# Patient Record
Sex: MALE | Race: BLACK OR AFRICAN AMERICAN | NOT HISPANIC OR LATINO | Employment: UNEMPLOYED | URBAN - METROPOLITAN AREA
[De-identification: names, ages, dates, MRNs, and addresses within clinical notes are randomized per-mention and may not be internally consistent; named-entity substitution may affect disease eponyms.]

---

## 2018-11-08 ENCOUNTER — DOCUMENTATION ONLY (OUTPATIENT)
Dept: SURGERY | Facility: CLINIC | Age: 68
End: 2018-11-08

## 2018-11-08 ENCOUNTER — OFFICE VISIT (OUTPATIENT)
Dept: SURGERY | Facility: CLINIC | Age: 68
End: 2018-11-08
Payer: COMMERCIAL

## 2018-11-08 VITALS
HEIGHT: 70 IN | SYSTOLIC BLOOD PRESSURE: 147 MMHG | OXYGEN SATURATION: 96 % | WEIGHT: 210.6 LBS | DIASTOLIC BLOOD PRESSURE: 82 MMHG | HEART RATE: 82 BPM | TEMPERATURE: 98.3 F | BODY MASS INDEX: 30.15 KG/M2

## 2018-11-08 DIAGNOSIS — K40.90 LEFT INGUINAL HERNIA: Primary | ICD-10-CM

## 2018-11-08 RX ORDER — CEFAZOLIN SODIUM 2 G/50ML
2 SOLUTION INTRAVENOUS
Status: CANCELLED | OUTPATIENT
Start: 2018-11-08

## 2018-11-08 RX ORDER — BIOTIN 10 MG
TABLET ORAL
COMMUNITY

## 2018-11-08 RX ORDER — CEFAZOLIN SODIUM 1 G/50ML
1 INJECTION, SOLUTION INTRAVENOUS SEE ADMIN INSTRUCTIONS
Status: CANCELLED | OUTPATIENT
Start: 2018-11-08

## 2018-11-08 ASSESSMENT — PAIN SCALES - GENERAL: PAINLEVEL: NO PAIN (0)

## 2018-11-08 NOTE — MR AVS SNAPSHOT
After Visit Summary   2018    William Kapoor    MRN: [id number]           Patient Information     Date Of Birth          1950        Visit Information        Provider Department      2018 9:45 AM Hui Heaton Jorge Alfredo, MD [facility] General Surgery        Care Instructions    You met with Dr. Jose Chahal.      Today's visit instructions:    You will need to see your PCP for a pre op history and physical prior to surgery. This needs to be done within 30 days of your surgery date.        If you have questions please contact Rolo RN or Nathaly RN during regular clinic hours, Monday through Friday 7:30 AM - 4:00 PM, or you can contact us via [employer] at anytime.       If you have urgent needs after-hours, weekends, or holidays please call the hospital at [phone] and ask to speak with our on-call General Surgery Team.    Appointment [phone], option #1   Nurse Advice (Rolo or Nathaly): [phone]   Surgery Scheduler ([place]): [phone]  Fax: [phone]        After Your Laparoscopic Left Robotic Inguinal Hernia Repair         Incision care     Remove the bandage the day after surgery, but leave the medical tape (Steri-Strips) or glue in place. These will loosen and fall off on their own 5 to 7 days after surgery.     You may take a shower the day after surgery. Carefully wash your incision with soap and water. Do not submerge yourself in water (bath, whirlpool, hot tub, pool, lake) for 14 days after surgery.       Always wash your hands before touching your incisions or removing bandages.      Other medicines     Wait to start aspirin or blood thinners until the day after surgery. You can continue your regular medicines at your normal time the day after surgery.     Your pain medicine may cause constipation (hard, dry stools). To help with this, take the stool softener your doctor gave you or an over-the-counter stool softener or laxative. You can stop  taking this when you are no longer taking pain medicine and your bowel movements are back to normal.      For pain or discomfort     Take the narcotic pain medicine your doctor gave you as needed and as instructed on the bottle. If you prefer to use over-the-counter medication, use acetaminophen (Tylenol) or ibuprofen (Advil, Motrin) as instructed on the box. Do not take Tylenol if it is in your narcotic pain medication.     Use an ice pack on your groin for 20 minutes at a time as needed for the first 24 hours. Be sure to protect your skin by putting a cloth between the ice pack and your skin.     After 24 hours you can switch to heat for 20 minutes as needed. Be sure to protect your skin by putting a cloth between the heat pack and your skin.     It is normal to feel a lump in your groin after surgery. This lump may take up to 8 weeks to go away.      Activities     No driving until you feel it s safe to do so. Don t drive while taking narcotic pain medicine.     You can return to your other activities as normal, no restrictions.      Special equipment     If we gave you an athletic supporter, wear this for the first 3 days after surgery. You can wear it longer than this if you wish.      Diet     You can eat your regular meals after surgery.      When to call the doctor   Call your doctor if you have:     A fever above 101 F (38.3 C) (taken under the tongue), or a fever or chills lasting more than a day.     Redness at the incision site.     Any fluid or blood draining from the incision, especially if it smells bad.      Severe pain that doesn t improve with pain medicine.      Go to the emergency room if:   You can t urinate (pee) or feel pressure when you urinate. We will place a small, thin tube (catheter) to empty your bladder. This needs to stay in place for at least 2 days.      We will call you 2 to 4 days after surgery to review this handout, answer questions and help arrange after-surgery care. If you  "have questions or concerns, please call [phone] during regular office hours. If you need to call after business hours, call [phone] and ask to page the surgeon on-call.                                     Follow-ups after your visit        Who to contact     Please call your clinic at [phone] to:    Ask questions about your health    Make or cancel appointments    Discuss your medicines    Learn about your test results    Speak to your doctor            Additional Information About Your Visit        [employer] Information     [employer] is an electronic gateway that provides easy, online access to your medical records. With [employer], you can request a clinic appointment, read your test results, renew a prescription or communicate with your care team.     To sign up for [employer] visit the website at [email or web address]   You will be asked to enter the access code listed below, as well as some personal information. Please follow the directions to create your username and password.     Your access code is: [email or web address]  Expires: 2019  5:30 AM     Your access code will  in 90 days. If you need help or a new code, please contact your [facility] or call [phone] for assistance.        Care EveryWhere ID     This is your Care EveryWhere ID. This could be used by other organizations to access your [facility] medical records  [id number]        Your Vitals Were     Pulse Temperature Height Pulse Oximetry BMI (Body Mass Index)       82 98.3  F (36.8  C) (Oral) 1.778 m (5' 10\") 96% 30.22 kg/m2        Blood Pressure from Last 3 Encounters:   18 147/82   07/09/15 142/84   06/09/15 (!) 157/94    Weight from Last 3 Encounters:   18 95.5 kg (210 lb 9.6 oz)   07/09/15 93.4 kg (206 lb)   06/09/15 94.6 kg (208 lb 9.6 oz)              Today, you had the following     No orders found for display       Primary Care Provider Office Phone # Fax #    Eric Lott " [phone]       [facility] [address]    [facility] [address]        Equal Access to Services     ROBERT WRIGHT : Hadii aad ku hadmargaritobirdie Bridgettali, ioanastiven manjarreztysonha, viviennechase okeefemaria l glenjosesito, jenny mercedesin hayaan glenjose villarreal lanirajkatherine ewa So [facility] [phone].    ATENCIÓN: Si habla español, tiene a molina disposición servicios gratuitos de asistencia lingüística. Mamie al [phone].    We comply with applicable federal civil rights laws and [place] laws. We do not discriminate on the basis of race, color, national origin, age, disability, sex, sexual orientation, or gender identity.            Thank you!     Thank you for choosing [facility] SURGERY  for your care. Our goal is always to provide you with excellent care. Hearing back from our patients is one way we can continue to improve our services. Please take a few minutes to complete the written survey that you may receive in the mail after your visit with us. Thank you!             Your Updated Medication List - Protect others around you: Learn how to safely use, store and throw away your medicines at www.disposemymeds.org.          This list is accurate as of 11/8/18 10:33 AM.  Always use your most recent med list.                   Brand Name Dispense Instructions for use Diagnosis    ASPIRIN PO           cloNIDine 0.1 MG tablet    CATAPRES     Take 0.1 mg by mouth 2 times daily        metoprolol succinate 25 MG 24 hr tablet    TOPROL-XL    45 tablet    Take 0.5 tablets (12.5 mg) by mouth daily    Hypertension goal BP (blood pressure) < 140/90       MULTIVITAMIN ADULT Chew           omeprazole 40 MG capsule    priLOSEC    30 capsule    Take 1 capsule (40 mg) by mouth daily Take 30-60 minutes before a meal.    Gastritis       order for DME     1 each    BP cuff, brand as covered by insurance.  Dx: HTN    Hypertension goal BP (blood pressure) < 140/90       order for DME     1 Device    Equipment being ordered: BP monitoring equipment for  daily use.    Hypertension

## 2018-11-08 NOTE — NURSING NOTE
Pre and Post op Patient Education/Teaching Flowsheet  Relevant Diagnosis:  Inguinal hernia  Teaching Topic:  Pre and post op teaching  Person(s) Involved in teaching:  Patient with the use of  services, Caridad    Motivation Level:  Asks Questions:  Yes  Eager to Learn:  Yes  Cooperative:  Yes  Receptive (willing/able to accept information):  Yes  Any cultural factors/Roman Catholic beliefs that may influence understanding or compliance?  No    Patient/caregiver/family demonstrates understanding of the following:  Reason for the appointment, diagnosis, and treatment plan:  Yes  Patient demonstrates understanding of the following:  Pre-op bowel prep:  No  Post-op pain management recommendations (medications, ice compress, binder/athletic supporter (if applicable), etc.:  Yes  Inguinal hernia patients:  Post-op urinary retention- discussed signs/symptoms and visit to ER for Hong catheter placement and to stay in place for at least 48 hours:  Yes  Restrictions:  Yes  Medications to take the day of surgery:  Per PCP  Blood thinner medications discussed and when to stop (if applicable):  Yes  Wound care:  Yes  Diabetes medication management (if applicable):  Per PCP  Which situations necessitate calling provider and whom to contact:  Discussed how to contact the hospital, nurse, and clinic scheduling staff if necessary      Date and time of surgery:  Yes  Location of surgery: [address] [place] [address]  History and Physical and any other testing necessary prior to surgery:  Yes  Required time line for completion of History and Physical and any pre-op testing:  Yes  Discuss need for someone to drive patient home and stay with them for 24 hours:  Yes  Pre-op showering/scrub information with Surgical Scrub:  Yes  NPO Guidelines:  NPO per Anesthesia Guidelines    Infection Prevention: Patient demonstrates understanding of the following:  Patient instructed on hand hygiene:  Yes  Surgical procedure site care will be taught  and will be reviewed at the time of discharge  Signs and symptoms of infection taught:  Yes  Wound care reviewed and will be taught at the time of discharge  Central venous catheter care will be taught at the time of discharge (if applicable)    Post-op follow-up:  Instructional materials used/given/mailed:  [facility] Surgery Booklet, post op teaching sheet, Map, Soap, and arrival/location information    Surgical instructions mailed to patient

## 2018-11-08 NOTE — PATIENT INSTRUCTIONS
You met with Dr. Jsoe Chahal.      Today's visit instructions:    You will need to see your PCP for a pre op history and physical prior to surgery. This needs to be done within 30 days of your surgery date.        If you have questions please contact Rolo RN or Nathaly RN during regular clinic hours, Monday through Friday 7:30 AM - 4:00 PM, or you can contact us via [employer] My Mobile at anytime.       If you have urgent needs after-hours, weekends, or holidays please call the hospital at [phone] and ask to speak with our on-call General Surgery Team.    Appointment [phone], option #1   Nurse Advice (Rolo or Nathaly): [phone]   Surgery Scheduler (Jaye): [phone]  Fax: [phone]        After Your Laparoscopic Left Robotic Inguinal Hernia Repair         Incision care     Remove the bandage the day after surgery, but leave the medical tape (Steri-Strips) or glue in place. These will loosen and fall off on their own 5 to 7 days after surgery.     You may take a shower the day after surgery. Carefully wash your incision with soap and water. Do not submerge yourself in water (bath, whirlpool, hot tub, pool, lake) for 14 days after surgery.       Always wash your hands before touching your incisions or removing bandages.      Other medicines     Wait to start aspirin or blood thinners until the day after surgery. You can continue your regular medicines at your normal time the day after surgery.     Your pain medicine may cause constipation (hard, dry stools). To help with this, take the stool softener your doctor gave you or an over-the-counter stool softener or laxative. You can stop taking this when you are no longer taking pain medicine and your bowel movements are back to normal.      For pain or discomfort     Take the narcotic pain medicine your doctor gave you as needed and as instructed on the bottle. If you prefer to use over-the-counter medication, use acetaminophen (Tylenol) or ibuprofen  (Advil, Motrin) as instructed on the box. Do not take Tylenol if it is in your narcotic pain medication.     Use an ice pack on your groin for 20 minutes at a time as needed for the first 24 hours. Be sure to protect your skin by putting a cloth between the ice pack and your skin.     After 24 hours you can switch to heat for 20 minutes as needed. Be sure to protect your skin by putting a cloth between the heat pack and your skin.     It is normal to feel a lump in your groin after surgery. This lump may take up to 8 weeks to go away.      Activities     No driving until you feel it s safe to do so. Don t drive while taking narcotic pain medicine.     You can return to your other activities as normal, no restrictions.      Special equipment     If we gave you an athletic supporter, wear this for the first 3 days after surgery. You can wear it longer than this if you wish.      Diet     You can eat your regular meals after surgery.      When to call the doctor   Call your doctor if you have:     A fever above 101 F (38.3 C) (taken under the tongue), or a fever or chills lasting more than a day.     Redness at the incision site.     Any fluid or blood draining from the incision, especially if it smells bad.      Severe pain that doesn t improve with pain medicine.      Go to the emergency room if:   You can t urinate (pee) or feel pressure when you urinate. We will place a small, thin tube (catheter) to empty your bladder. This needs to stay in place for at least 2 days.      We will call you 2 to 4 days after surgery to review this handout, answer questions and help arrange after-surgery care. If you have questions or concerns, please call [phone] during regular office hours. If you need to call after business hours, call [phone] and ask to page the surgeon on-call.

## 2018-11-08 NOTE — NURSING NOTE
"Chief Complaint   Patient presents with     Consult     NEW       Vitals:    11/08/18 1004   BP: 147/82   Pulse: 82   Temp: 98.3  F (36.8  C)   TempSrc: Oral   SpO2: 96%   Weight: 210 lb 9.6 oz   Height: 5' 10\"       Body mass index is 30.22 kg/(m^2).      Bob Thayer on 11/8/2018 at 10:10 AM                          "

## 2018-11-08 NOTE — PROGRESS NOTES
Patient is scheduled for surgery with Dr. Jose Chahal      Spoke with(through an ): William Kapoor in clinic about surgery dates    Date of Surgery: 11/21/2018 at 10:15 AM with Dr. Jose Chahal    H&P: To be scheduled with Eric Lott or a partner. Patient is aware that he can call to schedule a pre-op with the [facility] ([facility]) if he is unable to schedule with his primary doctor.      Informed patient they will need an adult : YES    Special Equipment: [employer] robot    Surgery packet sent: Given to patient in clinic on 11/08/2018    Additional comments: He also knows to call general surgery if he experiences any cold or flu symptoms. Surgery teaching and soap were given to in clinic on 11/08/2018. He was asked to call [phone] if he needs to reschedule and [phone] if he  experiences any symptoms.

## 2018-11-08 NOTE — LETTER
11/8/2018       RE: William Kapoor  [address]  [address]  [facility] [address]     Dear Colleague,    Thank you for referring your patient, William Kapoor, to the [facility] GENERAL SURGERY at [facility]. Please see a copy of my visit note below.    William Kapoor is a 68 year old male with a 1 month history of a left inguinal mass with the following symptoms of lump and pain.    Finding was not work related.  Onset did not occur with lifting.  Obstructive symptoms:  no  Urinary difficulties:  no  Chronic cough: no  Constipation:  no  Current level of activity:  Low, does not work, lives alone but family in area.    Past medical and surgical history, medications, allergies, family history, and social history were reviewed with the patient.    ROS: 10 point review of systems negative except noted in HPI  PHYSICAL EXAM  General appearance- healthy, alert, and in no distress.  Skin- Skin color and turgor normal.  No obvious rashes.  Neck- Neck is supple without obvious adenopathy.  Lungs- Respiratory effort unlabored.  Gait- Normal.  Abdomen - soft non distended, non tender without obvious masses.  Left inguinal hernia, right without defect.  Impression:  Inguinal hernia, left  Recommendation:  Laparoscopic left Inguinal Hernioplasty robot.    A full discussion regarding the alternatives, risks, goals, and potential complications for this surgery was completed today.  The patient understood I would use non recalled mesh and  that the potential problems included but are not limited to:  Infection, bleeding, hematoma, seroma, recurrence, injury to nerve/muscle or involved testicle, ischemic orchitis, and chronic pain.    The patient verbally expressed understanding, was given the opportunity for questions, and gives full informed consent for the procedure.      Today the patient was instructed on the need for a preoperative H&P, NPO status prior to surgery, and the need to stop  anticoagulants prior to surgery.  Additional educational material, soap, and instructions will be mailed out to the patients home.    The total time spent with this patient was 30 minutes.  Of this time, greater than 50% was spent counseling and coordinating care.      Lovenox:  No    Again, thank you for allowing me to participate in the care of your patient.      Sincerely,    Jose Chahal MD

## 2018-11-08 NOTE — PROGRESS NOTES
William Kapoor is a 68 year old male with a 1 month history of a left inguinal mass with the following symptoms of lump and pain.    Finding was not work related.  Onset did not occur with lifting.  Obstructive symptoms:  no  Urinary difficulties:  no  Chronic cough: no  Constipation:  no  Current level of activity:  Low, does not work, lives alone but family in area.    Past medical and surgical history, medications, allergies, family history, and social history were reviewed with the patient.    ROS: 10 point review of systems negative except noted in HPI  PHYSICAL EXAM  General appearance- healthy, alert, and in no distress.  Skin- Skin color and turgor normal.  No obvious rashes.  Neck- Neck is supple without obvious adenopathy.  Lungs- Respiratory effort unlabored.  Gait- Normal.  Abdomen - soft non distended, non tender without obvious masses.  Left inguinal hernia, right without defect.  Impression:  Inguinal hernia, left  Recommendation:  Laparoscopic left Inguinal Hernioplasty robot.    A full discussion regarding the alternatives, risks, goals, and potential complications for this surgery was completed today.  The patient understood I would use non recalled mesh and  that the potential problems included but are not limited to:  Infection, bleeding, hematoma, seroma, recurrence, injury to nerve/muscle or involved testicle, ischemic orchitis, and chronic pain.    The patient verbally expressed understanding, was given the opportunity for questions, and gives full informed consent for the procedure.      Today the patient was instructed on the need for a preoperative H&P, NPO status prior to surgery, and the need to stop anticoagulants prior to surgery.  Additional educational material, soap, and instructions will be mailed out to the patients home.    The total time spent with this patient was 30 minutes.  Of this time, greater than 50% was spent counseling and coordinating care.      Lovenox:  No

## 2018-11-19 ENCOUNTER — TELEPHONE (OUTPATIENT)
Dept: SURGERY | Facility: CLINIC | Age: 68
End: 2018-11-19

## 2018-11-19 NOTE — TELEPHONE ENCOUNTER
[facility] Call Center    Phone Message    May a detailed message be left on voicemail: yes    Reason for Call: Other: Roro calling from [employer], needs information for pt's pre-op physical, pt did not know location or number of where he had it done but he claims he did send everything to his doctor, please call Roro to make sure everything is there     Action Taken: Message routed to:  [facility] ([facility] General Surgery

## 2018-11-19 NOTE — TELEPHONE ENCOUNTER
This writer called the patient and left a message letting him know that his surgery will be cancelled tomorrow if he does not have a pre-op physical. Patient was asked to call [phone] to discuss.

## 2018-11-20 ENCOUNTER — ANESTHESIA EVENT (OUTPATIENT)
Dept: SURGERY | Facility: CLINIC | Age: 68
End: 2018-11-20
Payer: COMMERCIAL

## 2018-11-20 ENCOUNTER — TELEPHONE (OUTPATIENT)
Dept: SURGERY | Facility: CLINIC | Age: 68
End: 2018-11-20

## 2018-11-20 NOTE — TELEPHONE ENCOUNTER
This writer received a call from pre-admission nursing about this patient. The patient had not submitted a pre-op physcial. This writer called the patient and was given [phone] as a contact number for his doctor. This writer provided this number to the pre-admission nursing. They reached out to the clinic and asked them to fax a pre-op physical.    The patient said he would go to the clinic and ask them to send the notes. This writer received a call from Dr. Cruz asking if he could speak to the pre-admission nursing. This writer offered to transfer him and he opted not to be transferred. This writer confirmed fax number with the doctor.     This writer received a confirmation call from pre-admission nursing that the pre-op physical had been received and was okay to use for clearance.    Patient's son called and left a message asking for clarification about his father's surgery. This writer attempted to reach him and left a voicemail.

## 2018-11-20 NOTE — TELEPHONE ENCOUNTER
This writer called the patient using  services. This writer confirmed that the patient could have surgery tomorrow. The patient confirmed arrival time and location with this writer. He wanted to have the address sent to his phone and this writer was unable to do this for the patient. The patient asked his daughter-in-law to provide an email. This writer will send an email with the surgery information to her at [email or web address].

## 2018-11-20 NOTE — OR NURSING
Called around to numerous clinics looking for an H&P for patient. The most recent one I found was from [facility] with Dr. Pendleton in July of 2018.  Spoke with Jaye and she said the Dr. Chahal would like to cancel patient and reschedule when he gets his H&P completed.  Jaye will call patient with an  and explain this to him.

## 2018-11-21 ENCOUNTER — OFFICE VISIT (OUTPATIENT)
Dept: INTERPRETER SERVICES | Facility: CLINIC | Age: 68
End: 2018-11-21
Payer: COMMERCIAL

## 2018-11-21 ENCOUNTER — HOSPITAL ENCOUNTER (OUTPATIENT)
Facility: CLINIC | Age: 68
Discharge: HOME | End: 2018-11-21
Attending: SURGERY | Admitting: SURGERY
Payer: COMMERCIAL

## 2018-11-21 ENCOUNTER — SURGERY (OUTPATIENT)
Age: 68
End: 2018-11-21

## 2018-11-21 ENCOUNTER — ANESTHESIA (OUTPATIENT)
Dept: SURGERY | Facility: CLINIC | Age: 68
End: 2018-11-21
Payer: COMMERCIAL

## 2018-11-21 VITALS
TEMPERATURE: 97.9 F | DIASTOLIC BLOOD PRESSURE: 67 MMHG | HEIGHT: 70 IN | OXYGEN SATURATION: 95 % | BODY MASS INDEX: 29.76 KG/M2 | WEIGHT: 207.89 LBS | RESPIRATION RATE: 18 BRPM | SYSTOLIC BLOOD PRESSURE: 120 MMHG

## 2018-11-21 DIAGNOSIS — K40.90 LEFT INGUINAL HERNIA: Primary | ICD-10-CM

## 2018-11-21 LAB — GLUCOSE BLDC GLUCOMTR-MCNC: 111 MG/DL (ref 70–99)

## 2018-11-21 PROCEDURE — 37000008 ZZH ANESTHESIA TECHNICAL FEE, 1ST 30 MIN: Performed by: SURGERY

## 2018-11-21 PROCEDURE — 25000128 H RX IP 250 OP 636: Performed by: SURGERY

## 2018-11-21 PROCEDURE — C1781 MESH (IMPLANTABLE): HCPCS | Performed by: SURGERY

## 2018-11-21 PROCEDURE — 25000125 ZZHC RX 250: Performed by: NURSE ANESTHETIST, CERTIFIED REGISTERED

## 2018-11-21 PROCEDURE — 36000086 ZZH SURGERY LEVEL 8 1ST 30 MIN UMMC: Performed by: SURGERY

## 2018-11-21 PROCEDURE — 25000128 H RX IP 250 OP 636: Performed by: NURSE ANESTHETIST, CERTIFIED REGISTERED

## 2018-11-21 PROCEDURE — 71000027 ZZH RECOVERY PHASE 2 EACH 15 MINS: Performed by: SURGERY

## 2018-11-21 PROCEDURE — 25000128 H RX IP 250 OP 636: Performed by: ANESTHESIOLOGY

## 2018-11-21 PROCEDURE — 25000566 ZZH SEVOFLURANE, EA 15 MIN: Performed by: SURGERY

## 2018-11-21 PROCEDURE — 25000125 ZZHC RX 250: Performed by: ANESTHESIOLOGY

## 2018-11-21 PROCEDURE — 25000132 ZZH RX MED GY IP 250 OP 250 PS 637: Performed by: ANESTHESIOLOGY

## 2018-11-21 PROCEDURE — 27210794 ZZH OR GENERAL SUPPLY STERILE: Performed by: SURGERY

## 2018-11-21 PROCEDURE — C9290 INJ, BUPIVACAINE LIPOSOME: HCPCS | Performed by: ANESTHESIOLOGY

## 2018-11-21 PROCEDURE — T1013 SIGN LANG/ORAL INTERPRETER: HCPCS | Mod: U3

## 2018-11-21 PROCEDURE — 40000171 ZZH STATISTIC PRE-PROCEDURE ASSESSMENT III: Performed by: SURGERY

## 2018-11-21 PROCEDURE — 71000015 ZZH RECOVERY PHASE 1 LEVEL 2 EA ADDTL HR: Performed by: SURGERY

## 2018-11-21 PROCEDURE — 71000014 ZZH RECOVERY PHASE 1 LEVEL 2 FIRST HR: Performed by: SURGERY

## 2018-11-21 PROCEDURE — 82962 GLUCOSE BLOOD TEST: CPT

## 2018-11-21 PROCEDURE — 36000088 ZZH SURGERY LEVEL 8 EA 15 ADDTL MIN - UMMC: Performed by: SURGERY

## 2018-11-21 PROCEDURE — 25000132 ZZH RX MED GY IP 250 OP 250 PS 637: Performed by: SURGERY

## 2018-11-21 PROCEDURE — 37000009 ZZH ANESTHESIA TECHNICAL FEE, EACH ADDTL 15 MIN: Performed by: SURGERY

## 2018-11-21 DEVICE — MESH PROGRIP LAPAROSCOPIC 5.9X3.9" PARIETEX SELF-FIX LPG1510: Type: IMPLANTABLE DEVICE | Site: GROIN | Status: FUNCTIONAL

## 2018-11-21 RX ORDER — HYDROCODONE BITARTRATE AND ACETAMINOPHEN 5; 325 MG/1; MG/1
1-2 TABLET ORAL EVERY 4 HOURS PRN
Qty: 15 TABLET | Refills: 0 | Status: SHIPPED | OUTPATIENT
Start: 2018-11-21

## 2018-11-21 RX ORDER — ONDANSETRON 2 MG/ML
INJECTION INTRAMUSCULAR; INTRAVENOUS PRN
Status: DISCONTINUED | OUTPATIENT
Start: 2018-11-21 | End: 2018-11-21

## 2018-11-21 RX ORDER — LIDOCAINE HYDROCHLORIDE 20 MG/ML
INJECTION, SOLUTION INFILTRATION; PERINEURAL PRN
Status: DISCONTINUED | OUTPATIENT
Start: 2018-11-21 | End: 2018-11-21

## 2018-11-21 RX ORDER — BUPIVACAINE HYDROCHLORIDE AND EPINEPHRINE 2.5; 5 MG/ML; UG/ML
INJECTION, SOLUTION INFILTRATION; PERINEURAL PRN
Status: DISCONTINUED | OUTPATIENT
Start: 2018-11-21 | End: 2018-11-21

## 2018-11-21 RX ORDER — AMOXICILLIN 250 MG
1-2 CAPSULE ORAL 2 TIMES DAILY
Qty: 30 TABLET | Refills: 0 | Status: SHIPPED | OUTPATIENT
Start: 2018-11-21

## 2018-11-21 RX ORDER — HYDROCODONE BITARTRATE AND ACETAMINOPHEN 5; 325 MG/1; MG/1
1 TABLET ORAL
Status: COMPLETED | OUTPATIENT
Start: 2018-11-21 | End: 2018-11-21

## 2018-11-21 RX ORDER — PROPOFOL 10 MG/ML
INJECTION, EMULSION INTRAVENOUS PRN
Status: DISCONTINUED | OUTPATIENT
Start: 2018-11-21 | End: 2018-11-21

## 2018-11-21 RX ORDER — CEFAZOLIN SODIUM 2 G/100ML
2 INJECTION, SOLUTION INTRAVENOUS
Status: COMPLETED | OUTPATIENT
Start: 2018-11-21 | End: 2018-11-21

## 2018-11-21 RX ORDER — NALOXONE HYDROCHLORIDE 0.4 MG/ML
.1-.4 INJECTION, SOLUTION INTRAMUSCULAR; INTRAVENOUS; SUBCUTANEOUS
Status: DISCONTINUED | OUTPATIENT
Start: 2018-11-21 | End: 2018-11-21 | Stop reason: HOSPADM

## 2018-11-21 RX ORDER — CEFAZOLIN SODIUM 1 G/3ML
1 INJECTION, POWDER, FOR SOLUTION INTRAMUSCULAR; INTRAVENOUS SEE ADMIN INSTRUCTIONS
Status: DISCONTINUED | OUTPATIENT
Start: 2018-11-21 | End: 2018-11-21 | Stop reason: HOSPADM

## 2018-11-21 RX ORDER — FENTANYL CITRATE 50 UG/ML
25-50 INJECTION, SOLUTION INTRAMUSCULAR; INTRAVENOUS
Status: DISCONTINUED | OUTPATIENT
Start: 2018-11-21 | End: 2018-11-21 | Stop reason: HOSPADM

## 2018-11-21 RX ORDER — BUPIVACAINE HYDROCHLORIDE 2.5 MG/ML
INJECTION, SOLUTION EPIDURAL; INFILTRATION; INTRACAUDAL PRN
Status: DISCONTINUED | OUTPATIENT
Start: 2018-11-21 | End: 2018-11-21 | Stop reason: HOSPADM

## 2018-11-21 RX ORDER — DEXAMETHASONE SODIUM PHOSPHATE 4 MG/ML
INJECTION, SOLUTION INTRA-ARTICULAR; INTRALESIONAL; INTRAMUSCULAR; INTRAVENOUS; SOFT TISSUE PRN
Status: DISCONTINUED | OUTPATIENT
Start: 2018-11-21 | End: 2018-11-21

## 2018-11-21 RX ORDER — FLUMAZENIL 0.1 MG/ML
0.2 INJECTION, SOLUTION INTRAVENOUS
Status: DISCONTINUED | OUTPATIENT
Start: 2018-11-21 | End: 2018-11-21 | Stop reason: HOSPADM

## 2018-11-21 RX ORDER — ACETAMINOPHEN 325 MG/1
975 TABLET ORAL ONCE
Status: COMPLETED | OUTPATIENT
Start: 2018-11-21 | End: 2018-11-21

## 2018-11-21 RX ORDER — SODIUM CHLORIDE, SODIUM LACTATE, POTASSIUM CHLORIDE, CALCIUM CHLORIDE 600; 310; 30; 20 MG/100ML; MG/100ML; MG/100ML; MG/100ML
INJECTION, SOLUTION INTRAVENOUS CONTINUOUS PRN
Status: DISCONTINUED | OUTPATIENT
Start: 2018-11-21 | End: 2018-11-21

## 2018-11-21 RX ORDER — FENTANYL CITRATE 50 UG/ML
INJECTION, SOLUTION INTRAMUSCULAR; INTRAVENOUS PRN
Status: DISCONTINUED | OUTPATIENT
Start: 2018-11-21 | End: 2018-11-21

## 2018-11-21 RX ADMIN — PROPOFOL 170 MG: 10 INJECTION, EMULSION INTRAVENOUS at 09:53

## 2018-11-21 RX ADMIN — CEFAZOLIN SODIUM 2 G: 2 INJECTION, SOLUTION INTRAVENOUS at 10:00

## 2018-11-21 RX ADMIN — MIDAZOLAM 2 MG: 1 INJECTION INTRAMUSCULAR; INTRAVENOUS at 09:47

## 2018-11-21 RX ADMIN — SUGAMMADEX 200 MG: 100 INJECTION, SOLUTION INTRAVENOUS at 11:00

## 2018-11-21 RX ADMIN — BUPIVACAINE HYDROCHLORIDE AND EPINEPHRINE BITARTRATE 20 ML: 2.5; .005 INJECTION, SOLUTION INFILTRATION; PERINEURAL at 10:02

## 2018-11-21 RX ADMIN — DEXAMETHASONE SODIUM PHOSPHATE 6 MG: 4 INJECTION, SOLUTION INTRAMUSCULAR; INTRAVENOUS at 10:00

## 2018-11-21 RX ADMIN — ONDANSETRON 4 MG: 2 INJECTION INTRAMUSCULAR; INTRAVENOUS at 10:58

## 2018-11-21 RX ADMIN — PHENYLEPHRINE HYDROCHLORIDE 100 MCG: 10 INJECTION, SOLUTION INTRAMUSCULAR; INTRAVENOUS; SUBCUTANEOUS at 10:10

## 2018-11-21 RX ADMIN — ACETAMINOPHEN 975 MG: 325 TABLET, FILM COATED ORAL at 09:06

## 2018-11-21 RX ADMIN — BUPIVACAINE HYDROCHLORIDE 10 ML: 2.5 INJECTION, SOLUTION EPIDURAL; INFILTRATION; INTRACAUDAL at 10:13

## 2018-11-21 RX ADMIN — FENTANYL CITRATE 50 MCG: 50 INJECTION, SOLUTION INTRAMUSCULAR; INTRAVENOUS at 11:12

## 2018-11-21 RX ADMIN — SODIUM CHLORIDE, POTASSIUM CHLORIDE, SODIUM LACTATE AND CALCIUM CHLORIDE: 600; 310; 30; 20 INJECTION, SOLUTION INTRAVENOUS at 09:47

## 2018-11-21 RX ADMIN — FENTANYL CITRATE 25 MCG: 50 INJECTION, SOLUTION INTRAMUSCULAR; INTRAVENOUS at 12:23

## 2018-11-21 RX ADMIN — ROCURONIUM BROMIDE 50 MG: 10 INJECTION INTRAVENOUS at 09:54

## 2018-11-21 RX ADMIN — FENTANYL CITRATE 100 MCG: 50 INJECTION, SOLUTION INTRAMUSCULAR; INTRAVENOUS at 09:53

## 2018-11-21 RX ADMIN — BUPIVACAINE 20 ML: 13.3 INJECTION, SUSPENSION, LIPOSOMAL INFILTRATION at 10:02

## 2018-11-21 RX ADMIN — LIDOCAINE HYDROCHLORIDE 100 MG: 20 INJECTION, SOLUTION INFILTRATION; PERINEURAL at 09:53

## 2018-11-21 RX ADMIN — HYDROCODONE BITARTRATE AND ACETAMINOPHEN 1 TABLET: 5; 325 TABLET ORAL at 13:47

## 2018-11-21 NOTE — ANESTHESIA PROCEDURE NOTES
Peripheral Nerve Block Procedure Note    Staff:     Anesthesiologist:  CANDIS FLETCHER  Location: OR AFTER induction  Procedure Start/Stop TImes:      11/21/2018 10:00 AM     11/21/2018 10:05 AM    patient identified, IV checked, site marked, risks and benefits discussed, informed consent, monitors and equipment checked, pre-op evaluation, at physician/surgeon's request and post-op pain management      Correct Patient: Yes      Correct Position: Yes      Correct Site: Yes      Correct Procedure: Yes      Correct Laterality:  Yes    Site Marked:  Yes  Procedure details:     Procedure:  TAP    ASA:  2    Diagnosis:  Post op pain    Laterality:  Bilateral    Position:  Supine    Sterile Prep: chloraprep, mask and sterile gloves      Local skin infiltration:  None    Needle:  Short bevel    Needle gauge:  21    Needle length (inches):  4    Ultrasound: Yes      Ultrasound used to identify targeted nerve, plexus, or vascular structure and placed a needle adjacent to it      Permanent Image entered into patiient's record      Abnormal pain on injection: No      Blood Aspirated: No      Paresthesias:  No    Bleeding at site: No      Bolus via:  Needle    Infusion Method:  Single Shot    Complications:  None  Assessment/Narrative:     Injection made incrementally with aspirations every (mL):  5     Informed consent obtained.  All risks and benefits of the nerve block discussed with the patient.  All questions answered and all parties agreed with the plan.   Block was placed at the surgeon's request for post operative pain control.

## 2018-11-21 NOTE — ADDENDUM NOTE
Addendum  created 11/21/18 1245 by Isela Hardin APRN CRNA    Anesthesia Intra LDAs edited, LDA properties accepted

## 2018-11-21 NOTE — IP AVS SNAPSHOT
MRN:[id number]                      After Visit Summary   11/21/2018    William Kapoor    MRN: [id number]           Thank you!     Thank you for choosing [facility] for your care. Our goal is always to provide you with excellent care. Hearing back from our patients is one way we can continue to improve our services. Please take a few minutes to complete the written survey that you may receive in the mail after you visit with us. Thank you!        Patient Information     Date Of Birth          1950        About your hospital stay     You were admitted on:  November 21, 2018 You last received care in the:   PACU    You were discharged on:  November 21, 2018       Who to Call     For medical emergencies, please call 911.  For non-urgent questions about your medical care, please call your primary care provider or clinic, [phone]  For questions related to your surgery, please call your surgery clinic        Attending Provider     Provider Specialty    Jose Chahal MD Surgery       Primary Care Provider Office Phone # Fax #    [facility] [phone]      After Care Instructions     Diet Instructions       Resume pre-procedure diet            Shower       No shower for 24 hours post procedure. May shower Postoperative Day (POD)  1                  Further instructions from your care team       Discharge Instructions: Hernia Repair    You can usually go home the same day of surgery. To speed healing, take an active role in your recovery.     Diet    When you feel like eating, start with clear liquids (water, tea, ginger ale, Jell-O, broths, etc). Later that day you may progress to soft foods as tolerated.     You may eat a normal diet on the day following surgery    If nausea or vomiting present, continue with clear liquids until you feel better.     Discomfort    Place an ice pack to the incision to help reduce pain and swelling for 15-20 minutes at a  time.     The area around the incision and genital area may become swollen with bruising present. This is to be expected.     Take the pain medication as prescribed by your doctor.    The Pain medication may cause constipation. Try to avoid this by keeping your stools soft with bran or non-prescription stool softeners (DDS, Colace, Metamucil). If this is not effective, try one or two ounces of Milk of Magnesia or a gentle enema.     It is important for you to breathe deeply and to cough regularly to prevent lung infections. You will be able to cough more easily by placing a small pillow over the incision and applying moderate pressure during a cough.     Dressing    Try to keep the bandage in place until the morning following surgery.    You may shower after the top dressing is removed. If steri-strips or dermabond are used, you can shower with those in place.     Steri-strips will fall off in 7-10 days. You may trim edges as they peel up.     Activity     You may resume normal activity at your own pace    Take short walks to improve circulation    Avoid all heavy lifting greater than 10-15 pounds or straining for 1 week or until your doctor gives you permission.     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     Call your doctor if you develop any of the following:     A large amount of swelling or bruising that is progressively getting worse.    Fever over 101 degrees     Large amount of bright red bleeding that does not stop after holding pressure.    Increasing pain, tenderness, or drainage    Trouble urinating    Follow Up  Call your doctor to make a follow up appointment in 1 week or as otherwise instructed by your doctor.   Rev. 5/12  [facility], [place]  Same-Day Surgery   Adult Discharge Orders & Instructions     For 24 hours after surgery    1. Get plenty of rest.  A responsible  adult must stay with you for at least 24 hours after you leave the hospital.   2. Do not drive or use heavy equipment.  If you have weakness or tingling, don't drive or use heavy equipment until this feeling goes away.  3. Do not drink alcohol.  4. Avoid strenuous or risky activities.  Ask for help when climbing stairs.   5. You may feel lightheaded.  IF so, sit for a few minutes before standing.  Have someone help you get up.   6. If you have nausea (feel sick to your stomach): Drink only clear liquids such as apple juice, ginger ale, broth or 7-Up.  Rest may also help.  Be sure to drink enough fluids.  Move to a regular diet as you feel able.  7. You may have a slight fever. Call the doctor if your fever is over 100 F (37.7 C) (taken under the tongue) or lasts longer than 24 hours.  8. You may have a dry mouth, a sore throat, muscle aches or trouble sleeping.  These should go away after 24 hours.  9. Do not make important or legal decisions.   Call your doctor for any of the followin.  Signs of infection (fever, growing tenderness at the surgery site, a large amount of drainage or bleeding, severe pain, foul-smelling drainage, redness, swelling).    2. It has been over 8 to 10 hours since surgery and you are still not able to urinate (pass water).    3.  Headache for over 24 hours.    4.  Numbness, tingling or weakness the day after surgery (if you had spinal anesthesia).  To contact a doctor, call ________________________________________ or:        [phone] and ask for the resident on call for   ______________________________________________ (answered 24 hours a day)      Emergency Department:    [facility]: [phone]       (TTY for hearing impaired: [phone])    [facility]: [phone]       (TTY for hearing impaired: [phone])        Pending Results     Date and Time Order Name Status Description    2018 0859 POC US Guidance Needle Placement In process            "  Admission Information     Date & Time Provider Department Dept. Phone    2018 Jose Chahal MD  PACU [phone]      Your Vitals Were     Blood Pressure Temperature Respirations Height Weight Pulse Oximetry    120/68 97.9  F (36.6  C) (Axillary) 15 1.778 m (5' 10\") 94.3 kg (207 lb 14.3 oz) 94%    BMI (Body Mass Index)                   29.83 kg/m2           [facility] Information     [employer] lets you send messages to your doctor, view your test results, renew your prescriptions, schedule appointments and more. To sign up, go to [email or web address] . Click on \"Log in\" on the left side of the screen, which will take you to the Welcome page. Then click on \"Sign up Now\" on the right side of the page.     You will be asked to enter the access code listed below, as well as some personal information. Please follow the directions to create your username and password.     Your access code is: [email or web address]  Expires: 2019  5:30 AM     Your access code will  in 90 days. If you need help or a new code, please call your [facility] clinic or [phone].        Care EveryWhere ID     This is your Care EveryWhere ID. This could be used by other organizations to access your [facility] medical records  [id number]        Equal Access to Services     [facility] AH: Hadii murphy coronao Sorafaela, waaxda luqadaha, qaybta kaalmada ishda, jenny pope . So [facility] [phone].    ATENCIÓN: Si habla español, tiene a molina disposición servicios gratuitos de asistencia lingüística. Llame al [phone].    We comply with applicable federal civil rights laws and [place] laws. We do not discriminate on the basis of race, color, national origin, age, disability, sex, sexual orientation, or gender identity.               Review of your medicines      START taking        Dose / Directions    HYDROcodone-acetaminophen 5-325 MG tablet   Commonly known as:  NORCO   Used for:  Left inguinal " hernia        Dose:  1-2 tablet   Take 1-2 tablets by mouth every 4 hours as needed for moderate to severe pain   Quantity:  15 tablet   Refills:  0       senna-docusate 8.6-50 MG per tablet   Commonly known as:  SENOKOT-S;PERICOLACE   Used for:  Left inguinal hernia        Dose:  1-2 tablet   Take 1-2 tablets by mouth 2 times daily   Quantity:  30 tablet   Refills:  0         CONTINUE these medicines which have NOT CHANGED        Dose / Directions    ASPIRIN PO        Refills:  0       cloNIDine 0.1 MG tablet   Commonly known as:  CATAPRES        Dose:  0.1 mg   Take 0.1 mg by mouth 2 times daily   Refills:  0       metoprolol succinate 25 MG 24 hr tablet   Commonly known as:  TOPROL-XL   Used for:  Hypertension goal BP (blood pressure) < 140/90        Dose:  12.5 mg   Take 0.5 tablets (12.5 mg) by mouth daily   Quantity:  45 tablet   Refills:  3       MULTIVITAMIN ADULT Chew        Refills:  0       omeprazole 40 MG capsule   Commonly known as:  priLOSEC   Used for:  Gastritis        Dose:  40 mg   Take 1 capsule (40 mg) by mouth daily Take 30-60 minutes before a meal.   Quantity:  30 capsule   Refills:  1       order for DME   Used for:  Hypertension goal BP (blood pressure) < 140/90        BP cuff, brand as covered by insurance.  Dx: HTN   Quantity:  1 each   Refills:  0            Where to get your medicines      These medications were sent to [facility] [place], [place] - [address]  [address] [address]     Phone:  [phone]     senna-docusate 8.6-50 MG per tablet         Some of these will need a paper prescription and others can be bought over the counter. Ask your nurse if you have questions.     Bring a paper prescription for each of these medications     HYDROcodone-acetaminophen 5-325 MG tablet                Protect others around you: Learn how to safely use, store and throw away your medicines at www.disposemymeds.org.        Information about OPIOIDS      PRESCRIPTION OPIOIDS: WHAT YOU NEED TO KNOW   We gave you an opioid (narcotic) pain medicine. It is important to manage your pain, but opioids are not always the best choice. You should first try all the other options your care team gave you. Take this medicine for as short a time (and as few doses) as possible.    Some activities can increase your pain, such as bandage changes or therapy sessions. It may help to take your pain medicine 30 to 60 minutes before these activities. Reduce your stress by getting enough sleep, working on hobbies you enjoy and practicing relaxation or meditation. Talk to your care team about ways to manage your pain beyond prescription opioids.    These medicines have risks:    DO NOT drive when on new or higher doses of pain medicine. These medicines can affect your alertness and reaction times, and you could be arrested for driving under the influence (DUI). If you need to use opioids long-term, talk to your care team about driving.    DO NOT operate heavy machinery    DO NOT do any other dangerous activities while taking these medicines.    DO NOT drink any alcohol while taking these medicines.     If the opioid prescribed includes acetaminophen, DO NOT take with any other medicines that contain acetaminophen. Read all labels carefully. Look for the word  acetaminophen  or  Tylenol.  Ask your pharmacist if you have questions or are unsure.    You can get addicted to pain medicines, especially if you have a history of addiction (chemical, alcohol or substance dependence). Talk to your care team about ways to reduce this risk.    All opioids tend to cause constipation. Drink plenty of water and eat foods that have a lot of fiber, such as fruits, vegetables, prune juice, apple juice and high-fiber cereal. Take a laxative (Miralax, milk of magnesia, Colace, Senna) if you don t move your bowels at least every other day. Other side effects include upset stomach, sleepiness, dizziness,  throwing up, tolerance (needing more of the medicine to have the same effect), physical dependence and slowed breathing.    Store your pills in a secure place, locked if possible. We will not replace any lost or stolen medicine. If you don t finish your medicine, please throw away (dispose) as directed by your pharmacist. The [place] Pollution [employer] has more information about safe disposal: https://www.pca.[facility].[place].us/living-green/managing-unwanted-medications             Medication List: This is a list of all your medications and when to take them. Check marks below indicate your daily home schedule. Keep this list as a reference.      Medications           Morning Afternoon Evening Bedtime As Needed    ASPIRIN PO                                cloNIDine 0.1 MG tablet   Commonly known as:  CATAPRES   Take 0.1 mg by mouth 2 times daily                                HYDROcodone-acetaminophen 5-325 MG tablet   Commonly known as:  NORCO   Take 1-2 tablets by mouth every 4 hours as needed for moderate to severe pain                                metoprolol succinate 25 MG 24 hr tablet   Commonly known as:  TOPROL-XL   Take 0.5 tablets (12.5 mg) by mouth daily                                MULTIVITAMIN ADULT Chew                                omeprazole 40 MG capsule   Commonly known as:  priLOSEC   Take 1 capsule (40 mg) by mouth daily Take 30-60 minutes before a meal.                                order for DME   BP cuff, brand as covered by insurance.  Dx: HTN                                senna-docusate 8.6-50 MG per tablet   Commonly known as:  SENOKOT-S;PERICOLACE   Take 1-2 tablets by mouth 2 times daily

## 2018-11-21 NOTE — ANESTHESIA CARE TRANSFER NOTE
Patient: William Kapoor    Procedure(s):  DAVINCI ASSISTED LAPAROSCOPIC LEFT INGUINAL HERNIA    Diagnosis: LEFT INGUINAL HERNIA  Diagnosis Additional Information: No value filed.    Anesthesia Type:   General     Note:  Airway :Face Mask  Patient transferred to:PACU  Handoff Report: Identifed the Patient, Identified the Reponsible Provider, Reviewed the pertinent medical history, Discussed the surgical course, Reviewed Intra-OP anesthesia mangement and issues during anesthesia, Set expectations for post-procedure period and Allowed opportunity for questions and acknowledgement of understanding      Vitals: (Last set prior to Anesthesia Care Transfer)    CRNA VITALS  11/21/2018 1037 - 11/21/2018 1114      11/21/2018             Pulse: 90    SpO2: 97 %                Electronically Signed By: MAYA Zuñiga CRNA  November 21, 2018  11:14 AM

## 2018-11-21 NOTE — ANESTHESIA PREPROCEDURE EVALUATION
Anesthesia Pre-Procedure Evaluation    Patient: William Kapoor   MRN:     [id number] Gender:   male   Age:    68 year old :      1950        Preoperative Diagnosis: LEFT INGUINAL HERNIA   Procedure(s):  DAVINCI ASSISTED LAPAROSCOPIC LEFT INGUINAL HERNIA     Past Medical History:   Diagnosis Date     Hypertension       Past Surgical History:   Procedure Laterality Date     ORTHOPEDIC SURGERY      Left shoulder      ORTHOPEDIC SURGERY            Anesthesia Evaluation     . Pt has had prior anesthetic. Type: General           ROS/MED HX    ENT/Pulmonary:  - neg pulmonary ROS     Neurologic:  - neg neurologic ROS     Cardiovascular:     (+) hypertension----. : . . . :. .       METS/Exercise Tolerance:     Hematologic:  - neg hematologic  ROS       Musculoskeletal:  - neg musculoskeletal ROS       GI/Hepatic:  - neg GI/hepatic ROS       Renal/Genitourinary:  - ROS Renal section negative       Endo:  - neg endo ROS       Psychiatric:  - neg psychiatric ROS       Infectious Disease:  - neg infectious disease ROS       Malignancy:      - no malignancy   Other:                         PHYSICAL EXAM:   Mental Status/Neuro:    Airway: Facies: [place]  Mallampati: III  Mouth/Opening: Limited  TM distance: > 6 cm  Neck ROM: Full   Respiratory: Auscultation: CTAB     Resp. Rate: Normal     Resp. Effort: Normal      CV: Rhythm: Regular  Rate: Age appropriate  Heart: Normal Sounds   Comments:                    Lab Results   Component Value Date    WBC 7.1 2013    HGB 15.6 2013    HCT 42.3 2013     2013     2015    POTASSIUM 3.5 2015    CHLORIDE 104 2015    CO2 26 2015    BUN 15 2015    CR 0.84 2015     (H) 2015    JUANJO 8.9 2015    ALBUMIN 4.2 2008    PROTTOTAL 7.9 2008    ALT 13 2008    AST 29 2008    ALKPHOS 58 2008    BILITOTAL 0.7 2008       Preop Vitals  BP Readings from Last 3 Encounters:  "  11/21/18 144/87   11/08/18 147/82   07/09/15 142/84    Pulse Readings from Last 3 Encounters:   11/08/18 82   07/09/15 64   06/09/15 67      Resp Readings from Last 3 Encounters:   11/21/18 20   04/01/13 16   07/23/12 20    SpO2 Readings from Last 3 Encounters:   11/21/18 96%   11/08/18 96%   04/01/13 98%      Temp Readings from Last 1 Encounters:   11/21/18 36.8  C (98.2  F) (Oral)    Ht Readings from Last 1 Encounters:   11/21/18 1.778 m (5' 10\")      Wt Readings from Last 1 Encounters:   11/21/18 94.3 kg (207 lb 14.3 oz)    Estimated body mass index is 29.83 kg/(m^2) as calculated from the following:    Height as of this encounter: 1.778 m (5' 10\").    Weight as of this encounter: 94.3 kg (207 lb 14.3 oz).     LDA:  Peripheral IV 04/01/13 Right Hand (Active)   Number of days:2060            Assessment:   ASA SCORE: 2    NPO Status: > 6 hours since completed Solid Foods   Documentation: H&P complete; Preop Testing complete; Consents complete   Proceeding: Proceed without further delay  Tobacco Use:  NO Active use of Tobacco/UNKNOWN Tobacco use status     Plan:   Anes. Type:  General   Pre-Induction: Midazolam IV; Acetaminophen PO   Induction:  IV (Standard)   Airway: Oral ETT; CMAC/VL   Access/Monitoring: PIV   Maintenance: Balanced   Emergence: Procedure Site   Logistics: Same Day Surgery     Postop Pain/Sedation Strategy:  Standard-Options: Opioids PRN     PONV Management:  Adult Risk Factors:, Non-Smoker, Postop Opioids     CONSENT: Direct conversation   Plan and risks discussed with: Patient   Blood Products: Consent Deferred (Minimal Blood Loss)     Comments for Plan/Consent:  C-MAC please;  'd' blade if available                         Zeferino Milton MD, MD  "

## 2018-11-21 NOTE — IP AVS SNAPSHOT
[facility]    [address] [place] [address]    Phone:  [phone]                                       After Visit Summary   11/21/2018    William Kapoor    MRN: [id number]           After Visit Summary Signature Page     I have received my discharge instructions, and my questions have been answered. I have discussed any challenges I see with this plan with the nurse or doctor.    ..........................................................................................................................................  Patient/Patient Representative Signature      ..........................................................................................................................................  Patient Representative Print Name and Relationship to Patient    ..................................................               ................................................  Date                                   Time    ..........................................................................................................................................  Reviewed by Signature/Title    ...................................................              ..............................................  Date                                               Time          22EPIC Rev 08/18

## 2018-11-21 NOTE — DISCHARGE INSTRUCTIONS
Discharge Instructions: Hernia Repair    You can usually go home the same day of surgery. To speed healing, take an active role in your recovery.     Diet    When you feel like eating, start with clear liquids (water, tea, ginger ale, Jell-O, broths, etc). Later that day you may progress to soft foods as tolerated.     You may eat a normal diet on the day following surgery    If nausea or vomiting present, continue with clear liquids until you feel better.     Discomfort    Place an ice pack to the incision to help reduce pain and swelling for 15-20 minutes at a time.     The area around the incision and genital area may become swollen with bruising present. This is to be expected.     Take the pain medication as prescribed by your doctor.    The Pain medication may cause constipation. Try to avoid this by keeping your stools soft with bran or non-prescription stool softeners (DDS, Colace, Metamucil). If this is not effective, try one or two ounces of Milk of Magnesia or a gentle enema.     It is important for you to breathe deeply and to cough regularly to prevent lung infections. You will be able to cough more easily by placing a small pillow over the incision and applying moderate pressure during a cough.     Dressing    Try to keep the bandage in place until the morning following surgery.    You may shower after the top dressing is removed. If steri-strips or dermabond are used, you can shower with those in place.     Steri-strips will fall off in 7-10 days. You may trim edges as they peel up.     Activity     You may resume normal activity at your own pace    Take short walks to improve circulation    Avoid all heavy lifting greater than 10-15 pounds or straining for 1 week or until your doctor gives you permission.     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     Call your doctor if you develop any of the  following:     A large amount of swelling or bruising that is progressively getting worse.    Fever over 101 degrees     Large amount of bright red bleeding that does not stop after holding pressure.    Increasing pain, tenderness, or drainage    Trouble urinating    Follow Up  Call your doctor to make a follow up appointment in 1 week or as otherwise instructed by your doctor.   Rev.   [facility], [place]  Same-Day Surgery   Adult Discharge Orders & Instructions     For 24 hours after surgery    1. Get plenty of rest.  A responsible adult must stay with you for at least 24 hours after you leave the hospital.   2. Do not drive or use heavy equipment.  If you have weakness or tingling, don't drive or use heavy equipment until this feeling goes away.  3. Do not drink alcohol.  4. Avoid strenuous or risky activities.  Ask for help when climbing stairs.   5. You may feel lightheaded.  IF so, sit for a few minutes before standing.  Have someone help you get up.   6. If you have nausea (feel sick to your stomach): Drink only clear liquids such as apple juice, ginger ale, broth or 7-Up.  Rest may also help.  Be sure to drink enough fluids.  Move to a regular diet as you feel able.  7. You may have a slight fever. Call the doctor if your fever is over 100 F (37.7 C) (taken under the tongue) or lasts longer than 24 hours.  8. You may have a dry mouth, a sore throat, muscle aches or trouble sleeping.  These should go away after 24 hours.  9. Do not make important or legal decisions.   Call your doctor for any of the followin.  Signs of infection (fever, growing tenderness at the surgery site, a large amount of drainage or bleeding, severe pain, foul-smelling drainage, redness, swelling).    2. It has been over 8 to 10 hours since surgery and you are still not able to urinate (pass water).    3.  Headache for over 24 hours.    4.  Numbness, tingling or weakness the day after surgery (if  you had spinal anesthesia).  To contact a doctor, call ________________________________________ or:        [phone] and ask for the resident on call for   ______________________________________________ (answered 24 hours a day)      Emergency Department:    [place]: [phone]       ([place] for hearing impaired: [phone])    [place]: [phone]       (TTY for hearing impaired: [phone])

## 2018-11-21 NOTE — BRIEF OP NOTE
[facility] Brief Operative Note    Pre-operative diagnosis: LEFT INGUINAL HERNIA   Post-operative diagnosis Same as Pre-op Dx   Procedure: Procedure(s):  DAVINCI ASSISTED LAPAROSCOPIC LEFT INGUINAL HERNIA   Surgeon: Jose Chahal MD   Assistants(s):    Estimated blood loss: Minimal    Specimens: None   Findings: LIDH

## 2018-11-21 NOTE — ADDENDUM NOTE
Addendum  created 11/21/18 1313 by Jaison Kiser,     Anesthesia Attestations filed, Anesthesia Intra Blocks edited, Anesthesia Intra Meds edited, Child order released for a procedure order, Result filed, Sign clinical note

## 2018-11-21 NOTE — ANESTHESIA POSTPROCEDURE EVALUATION
Anesthesia POST Procedure Evaluation    Patient: William Kapoor   MRN:     [id number] Gender:   male   Age:    68 year old :      1950        Preoperative Diagnosis: LEFT INGUINAL HERNIA   Procedure(s):  DAVINCI ASSISTED LAPAROSCOPIC LEFT INGUINAL HERNIA   Postop Comments: No value filed.       Anesthesia Type:  General    Reportable Event: NO     PAIN: Uncomplicated   Sign Out status: Comfortable, Well controlled pain     PONV: No PONV   Sign Out status:  No Nausea or Vomiting     Neuro/Psych: Uneventful perioperative course   Sign Out Status: Preoperative baseline; Age appropriate mentation     Airway/Resp.: Uneventful perioperative course   Sign Out Status: Non labored breathing, age appropriate RR; Resp. Status within EXPECTED Parameters     CV: Uneventful perioperative course   Sign Out status: Appropriate BP and perfusion indices; Appropriate HR/Rhythm     Disposition:   Sign Out in:  PACU  Recovery Course: Uneventful  Follow-Up: Not required           Last Anesthesia Record Vitals:  CRNA VITALS  2018 0942 - 2018 1012      2018             Pulse: 70    Ht Rate: 70    Temp 2: 35.9  C (96.6  F)    SpO2: 98 %    Resp Rate (observed): 10          Last PACU/Preop Vitals:  Vitals:    18 0834   BP: 144/87   Resp: 20   Temp: 36.8  C (98.2  F)   SpO2: 96%         Electronically Signed By: Zeferino Milton MD, MD, 2018, 10:12 AM

## 2018-11-22 NOTE — OP NOTE
Procedure Date: 11/21/2018      PREOPERATIVE DIAGNOSIS:  Left inguinal hernia.       POSTOPERATIVE DIAGNOSES:  Left inguinal hernia.      PROCEDURE:  Laparoscopic left inguinal hernioplasty, robot-assisted.      SURGEON:  Jose Chahal MD      ANESTHESIA:  General endotracheal.      INDICATIONS FOR PROCEDURE:  The patient presents with a left inguinal hernia.  Informed consent obtained.      OPERATIVE FINDINGS:  Left inguinal direct hernia.      PROCEDURE:  The patient was brought to the operating room and put under general anesthesia.  His abdomen was widely prepped and draped in the usual sterile fashion.  A supraumbilical skin incision was made.  Dissection carried down to a very small umbilical defect, which was opened to allow for placement of a Veress needle and at 12 port under direct vision without difficulty.  The abdomen was insufflated.  A port was placed in the left-right lateral without difficulty.  There were some adhesions to anterior abdominal wall.  These were taken down.  They were flimsy omental adhesions.  At this point, the 15 x 10 cm Parietex mesh had been rolled supine, placed in a Penrose sleeve, and passed through the 12-port site as was the 3-0 V-Loc.  At this point, the robot was docked, and I turned my attention to the console.  The peritoneum was opened to the left to enter the preperitoneal space after a Veress needle was placed the midline for positioning help.  Once the preperitoneal space was entered, I encountered a fair-sized direct defect with the omentum incarcerated there.  I was able to reduce without difficulty thus exposing the linda defect.  The cord was peritonealized, and this allowed for placement of the 15 x 10 cm Parietex ProGrip mesh to completely cover the left myopectineal orifice.  The mesh extended just beyond the midline and carried down to Sam's ligament.  The peritoneal defect was then closed with a running 3-0 V-Loc without difficulty.  The Verres was  opened.  This allowed for decompression of the preperitoneal space thus noting good placement of the mesh.  At this point, the needle and the Penrose were removed under direct vision.  The abdomen was then deflated.  Ports removed.  The Veress needle was removed.  Fascial defect at the umbilicus was closed with a series of figure-of-eight Vicryls.  Skin closed with subcuticular, Steri-Strips applied.  Estimated blood loss was minimal.  The patient tolerated the procedure well and was taken to the recovery room where he was without difficulty or apparent complication.         NANY SIBLEY MD             D: 2018   T: 2018   MT:       Name:     JAGDEEP GOTTLIEB   MRN:      [id number]        Account:        [id number]   :      1950           Procedure Date: 2018      Document: [id number]

## 2018-11-26 ENCOUNTER — PATIENT OUTREACH (OUTPATIENT)
Dept: SURGERY | Facility: CLINIC | Age: 68
End: 2018-11-26

## 2018-11-26 NOTE — LETTER
11/26/2018           TO: William Kapoor  [address]  [facility] [address]           Dear William,  This letter serves as a confirmation of what we discussed in our recent phone conversation.      Appointment date December 3, 2018   Appointment time 9:30 AM  Note: Please arrive 15 minutes prior to your appointment   Provider Jose Chahal MD   [facility] SURGERY  [address]  [address] [place] [facility] [address]  Phone: [phone]  Fax: [phone]     Sincerely,  Nathaly MACKEY RN  Phone Number: [phone]

## 2018-11-26 NOTE — PROGRESS NOTES
RN Post-Op/Post-Discharge Care Coordination Note    Mr. William Kapoor is a 68 year old male who underwent robotic LIH on 11/21 with  Dr. Jose Chahal.  Spoke with Patient with the assistance of a Maltese .    Support  Patient able to care for self independently     Health Status  Fevers/chills: Patient denies any fever or chills.  Nausea/Vomiting: Patient denies nausea/vomiting.  Eating/drinking: Patient is able to eat and drink without any complaints.  Bowel habits: Patient reports having a normal bowel movement.  Drains (MARIBEL): N/A  Incisions: Patient denies any signs and symptoms of infection..  Wound closure:  Steri-strips   Pain: Improving.  Discussed weaning off of narcotics.  New Medications:  Senna, Norco    Activity/Restrictions  No restrictions    Equipment  None    Pathology reviewed with patient:  N/A    Forms/Letters  No    All of his questions were answered.  He will call this office if he has any further questions and/or concerns.      Post op appointment arranged 12/3 at 0930.  Reminder mailed to patients home.    A copy of this note was routed to the primary surgeon.      Whom and When to Call  Patient acknowledges understanding of how to manage any medication changes and   when to seek medical care.     Patient advised that if after hour medical concerns arise to please call [phone] and choose option 4 to speak to the physician on call.

## 2018-12-03 ENCOUNTER — OFFICE VISIT (OUTPATIENT)
Dept: SURGERY | Facility: CLINIC | Age: 68
End: 2018-12-03
Payer: COMMERCIAL

## 2018-12-03 VITALS
TEMPERATURE: 97.9 F | WEIGHT: 209.4 LBS | OXYGEN SATURATION: 100 % | BODY MASS INDEX: 30.05 KG/M2 | HEART RATE: 73 BPM | SYSTOLIC BLOOD PRESSURE: 141 MMHG | DIASTOLIC BLOOD PRESSURE: 76 MMHG

## 2018-12-03 DIAGNOSIS — Z98.890 POSTOPERATIVE STATE: Primary | ICD-10-CM

## 2018-12-03 ASSESSMENT — PAIN SCALES - GENERAL: PAINLEVEL: NO PAIN (0)

## 2018-12-03 NOTE — NURSING NOTE
Chief Complaint   Patient presents with     Surgical Followup     Robotic [facility]       Vitals:    12/03/18 0947   BP: 141/76   Pulse: 73   Temp: 97.9  F (36.6  C)   TempSrc: Oral   SpO2: 100%   Weight: 209 lb 6.4 oz       Body mass index is 30.05 kg/(m^2).      Bob Thayer on 12/3/2018 at 9:53 AM

## 2018-12-03 NOTE — PATIENT INSTRUCTIONS
You met with Dr. Jose Chahal.      Today's visit instructions:    Return to the Surgery Clinic on an as needed basis.        If you have questions please contact Rolo RN or Nathaly RN during regular clinic hours, Monday through Friday 7:30 AM - 4:00 PM, or you can contact us via [employer] at anytime.       If you have urgent needs after-hours, weekends, or holidays please call the hospital at [phone] and ask to speak with our on-call General Surgery Team.    Appointment [phone], option #1   Nurse Advice (Rolo or Nathaly): [phone]   Surgery Scheduler (Jaye): [phone]  Fax: [phone]

## 2018-12-03 NOTE — LETTER
12/3/2018       RE: William Kapoor  [address]  [place] [address]     Dear Colleague,    Thank you for referring your patient, William Kapoor, to the [facility] SURGERY at [facility]. Please see a copy of my visit note below.    William Kapoor is status post:  Lap LIH robot assisted  Surgery without complications.  Post-op course without complications.  Incisions examined, no signs of infection.  All of the patients questions were answered.  Standard post-op instructions and restrictions given.  Follow-up with me on a PRN basis.      Again, thank you for allowing me to participate in the care of your patient.      Sincerely,    Jose Chahal MD

## 2018-12-03 NOTE — PROGRESS NOTES
William Kapoor is status post:  Lap LIH robot assisted  Surgery without complications.  Post-op course without complications.  Incisions examined, no signs of infection.  All of the patients questions were answered.  Standard post-op instructions and restrictions given.  Follow-up with me on a PRN basis.

## 2021-09-28 ENCOUNTER — APPOINTMENT (OUTPATIENT)
Dept: CT IMAGING | Facility: CLINIC | Age: 71
End: 2021-09-28
Attending: EMERGENCY MEDICINE
Payer: COMMERCIAL

## 2021-09-28 ENCOUNTER — HOSPITAL ENCOUNTER (EMERGENCY)
Facility: CLINIC | Age: 71
Discharge: HOME | End: 2021-09-28
Attending: EMERGENCY MEDICINE | Admitting: EMERGENCY MEDICINE
Payer: COMMERCIAL

## 2021-09-28 VITALS
WEIGHT: 230 LBS | TEMPERATURE: 97.5 F | RESPIRATION RATE: 16 BRPM | HEIGHT: 70 IN | OXYGEN SATURATION: 99 % | SYSTOLIC BLOOD PRESSURE: 155 MMHG | HEART RATE: 66 BPM | BODY MASS INDEX: 32.93 KG/M2 | DIASTOLIC BLOOD PRESSURE: 79 MMHG

## 2021-09-28 DIAGNOSIS — N40.0 PROSTATIC ENLARGEMENT: ICD-10-CM

## 2021-09-28 DIAGNOSIS — R31.9 HEMATURIA, UNSPECIFIED TYPE: ICD-10-CM

## 2021-09-28 LAB
ALBUMIN SERPL-MCNC: 3.3 G/DL (ref 3.4–5)
ALBUMIN UR-MCNC: 50 MG/DL
ALP SERPL-CCNC: 60 U/L (ref 40–150)
ALT SERPL W P-5'-P-CCNC: 19 U/L (ref 0–70)
ANION GAP SERPL CALCULATED.3IONS-SCNC: 4 MMOL/L (ref 3–14)
APPEARANCE UR: CLEAR
AST SERPL W P-5'-P-CCNC: 18 U/L (ref 0–45)
BASOPHILS # BLD AUTO: 0 10E3/UL (ref 0–0.2)
BASOPHILS NFR BLD AUTO: 0 %
BILIRUB SERPL-MCNC: 0.6 MG/DL (ref 0.2–1.3)
BILIRUB UR QL STRIP: NEGATIVE
BUN SERPL-MCNC: 15 MG/DL (ref 7–30)
CALCIUM SERPL-MCNC: 8.9 MG/DL (ref 8.5–10.1)
CHLORIDE BLD-SCNC: 108 MMOL/L (ref 94–109)
CO2 SERPL-SCNC: 26 MMOL/L (ref 20–32)
COLOR UR AUTO: YELLOW
CREAT SERPL-MCNC: 0.88 MG/DL (ref 0.66–1.25)
EOSINOPHIL # BLD AUTO: 0.1 10E3/UL (ref 0–0.7)
EOSINOPHIL NFR BLD AUTO: 1 %
ERYTHROCYTE [DISTWIDTH] IN BLOOD BY AUTOMATED COUNT: 13 % (ref 10–15)
GFR SERPL CREATININE-BSD FRML MDRD: 88 ML/MIN/1.73M2
GLUCOSE BLD-MCNC: 107 MG/DL (ref 70–99)
GLUCOSE UR STRIP-MCNC: NEGATIVE MG/DL
HCT VFR BLD AUTO: 44.3 % (ref 40–53)
HGB BLD-MCNC: 15.6 G/DL (ref 13.3–17.7)
HGB UR QL STRIP: ABNORMAL
IMM GRANULOCYTES # BLD: 0 10E3/UL
IMM GRANULOCYTES NFR BLD: 0 %
KETONES UR STRIP-MCNC: NEGATIVE MG/DL
LEUKOCYTE ESTERASE UR QL STRIP: NEGATIVE
LYMPHOCYTES # BLD AUTO: 1.9 10E3/UL (ref 0.8–5.3)
LYMPHOCYTES NFR BLD AUTO: 25 %
MCH RBC QN AUTO: 32.7 PG (ref 26.5–33)
MCHC RBC AUTO-ENTMCNC: 35.2 G/DL (ref 31.5–36.5)
MCV RBC AUTO: 93 FL (ref 78–100)
MONOCYTES # BLD AUTO: 0.7 10E3/UL (ref 0–1.3)
MONOCYTES NFR BLD AUTO: 9 %
MUCOUS THREADS #/AREA URNS LPF: PRESENT /LPF
NEUTROPHILS # BLD AUTO: 4.7 10E3/UL (ref 1.6–8.3)
NEUTROPHILS NFR BLD AUTO: 65 %
NITRATE UR QL: NEGATIVE
NRBC # BLD AUTO: 0 10E3/UL
NRBC BLD AUTO-RTO: 0 /100
PH UR STRIP: 5.5 [PH] (ref 5–7)
PLATELET # BLD AUTO: 212 10E3/UL (ref 150–450)
POTASSIUM BLD-SCNC: 3.9 MMOL/L (ref 3.4–5.3)
PROT SERPL-MCNC: 8 G/DL (ref 6.8–8.8)
RBC # BLD AUTO: 4.77 10E6/UL (ref 4.4–5.9)
RBC URINE: 134 /HPF
SODIUM SERPL-SCNC: 138 MMOL/L (ref 133–144)
SP GR UR STRIP: 1.03 (ref 1–1.03)
UROBILINOGEN UR STRIP-MCNC: 2 MG/DL
WBC # BLD AUTO: 7.4 10E3/UL (ref 4–11)
WBC URINE: 6 /HPF

## 2021-09-28 PROCEDURE — 81001 URINALYSIS AUTO W/SCOPE: CPT | Performed by: EMERGENCY MEDICINE

## 2021-09-28 PROCEDURE — 74176 CT ABD & PELVIS W/O CONTRAST: CPT

## 2021-09-28 PROCEDURE — 74176 CT ABD & PELVIS W/O CONTRAST: CPT | Mod: 26 | Performed by: STUDENT IN AN ORGANIZED HEALTH CARE EDUCATION/TRAINING PROGRAM

## 2021-09-28 PROCEDURE — 99284 EMERGENCY DEPT VISIT MOD MDM: CPT | Performed by: EMERGENCY MEDICINE

## 2021-09-28 PROCEDURE — 80053 COMPREHEN METABOLIC PANEL: CPT | Performed by: EMERGENCY MEDICINE

## 2021-09-28 PROCEDURE — 99284 EMERGENCY DEPT VISIT MOD MDM: CPT | Mod: 25

## 2021-09-28 PROCEDURE — 85025 COMPLETE CBC W/AUTO DIFF WBC: CPT | Performed by: EMERGENCY MEDICINE

## 2021-09-28 PROCEDURE — 36415 COLL VENOUS BLD VENIPUNCTURE: CPT | Performed by: EMERGENCY MEDICINE

## 2021-09-28 ASSESSMENT — ENCOUNTER SYMPTOMS
VOMITING: 0
DYSURIA: 0
ABDOMINAL PAIN: 1
NAUSEA: 0
DIARRHEA: 0
HEMATURIA: 1
FLANK PAIN: 0
FEVER: 0
CHILLS: 0

## 2021-09-28 ASSESSMENT — MIFFLIN-ST. JEOR: SCORE: 1814.52

## 2021-09-28 NOTE — ED PROVIDER NOTES
[place] EMERGENCY DEPARTMENT ([facility])  9/28/21  History     Chief Complaint   Patient presents with     Hematuria     The history is provided by the patient and medical records.     William Kapoor is a 71 year old male with a past medical history significant for hypertension, elevated prostate specific antigen, hypokalemia, right testicular pain, hernia s/p Davinci herniorrhaphy inguinal (left, 2018) who presents to the Emergency Department for evaluation of hematuria for the past week. Patient is here with his son who is translating for him. Patient reports that he notices hematuria only after passing a bowel movement for the past week.  He also endorses a small, painful bump located between his anus and testicles that has been present for 2 weeks.  He endorses pain below his umbilicus for the past two weeks which is exacerbated when he unbuckles his pants.  Patient reports a history of a hernia which was repaired.  He states that this pain feels similar to when he had a hernia.  Patient denies dysuria, fever, chills, flank pain, nausea, vomiting or diarrhea.  He endorses intermittent pain with bowel movements.  Patient denies blood on the toilet paper when he wipes.  He endorses history of hemorrhoids but states that his symptoms feel different to when he suffered from this.     Past Medical History:   Diagnosis Date     Hypertension        Past Surgical History:   Procedure Laterality Date     DAVINCI HERNIORRHAPHY INGUINAL Left 11/21/2018    Procedure: DAVINCI ASSISTED LAPAROSCOPIC LEFT INGUINAL HERNIA;  Surgeon: Jose Chahal MD;  Location: [facility] OR     ORTHOPEDIC SURGERY      Left shoulder      ORTHOPEDIC SURGERY         Family History   Problem Relation Age of Onset     Diabetes No family hx of      Hypertension No family hx of        Social History     Tobacco Use     Smoking status: Never Smoker     Smokeless tobacco: Never Used   Substance Use Topics     Alcohol use: No       No  "current facility-administered medications for this encounter.     Current Outpatient Medications   Medication     ASPIRIN PO     cloNIDine (CATAPRES) 0.1 MG tablet     HYDROcodone-acetaminophen (NORCO) 5-325 MG per tablet     metoprolol (TOPROL-XL) 25 MG 24 hr tablet     Multiple Vitamins-Minerals (MULTIVITAMIN ADULT) CHEW     omeprazole (PRILOSEC) 40 MG capsule     ORDER FOR DME     senna-docusate (SENOKOT-S;PERICOLACE) 8.6-50 MG per tablet      No Known Allergies    I have reviewed the Medications, Allergies, Past Medical and Surgical History, and Social History in the Epic system.    Review of Systems   Constitutional: Negative for chills and fever.   Gastrointestinal: Positive for abdominal pain. Negative for diarrhea, nausea and vomiting.   Genitourinary: Positive for hematuria. Negative for dysuria and flank pain.   All other systems reviewed and are negative.    A complete review of systems was performed with pertinent positives and negatives noted in the HPI, and all other systems negative.    Physical Exam   BP: (!) 165/74  Pulse: 76  Temp: 97.5  F (36.4  C)  Resp: 16  Height: 177.8 cm (5' 10\")  Weight: 104.3 kg (230 lb)  SpO2: 95 %      Physical Exam  Vitals and nursing note reviewed.   Constitutional:       General: He is not in acute distress.     Appearance: Normal appearance. He is not diaphoretic.   HENT:      Head: Atraumatic.   Eyes:      General: No scleral icterus.     Pupils: Pupils are equal, round, and reactive to light.   Cardiovascular:      Heart sounds: Normal heart sounds.   Pulmonary:      Effort: No respiratory distress.      Breath sounds: Normal breath sounds.   Abdominal:      General: Bowel sounds are normal.      Palpations: Abdomen is soft.      Tenderness: There is no abdominal tenderness.   Musculoskeletal:         General: No tenderness.   Skin:     General: Skin is warm.      Findings: No rash.   Neurological:      General: No focal deficit present.      Mental Status: He is " alert and oriented to person, place, and time.         ED Course     At 3:20 PM the patient was seen and examined by Yokasta Galvin MD in Room ED25.        Procedures                 Results for orders placed or performed during the hospital encounter of 09/28/21 (from the past 24 hour(s))   UA with Microscopic reflex to Culture    Specimen: Urine, Midstream   Result Value Ref Range    Color Urine Yellow Colorless, Straw, Light Yellow, Yellow    Appearance Urine Clear Clear    Glucose Urine Negative Negative mg/dL    Bilirubin Urine Negative Negative    Ketones Urine Negative Negative mg/dL    Specific Gravity Urine 1.028 1.003 - 1.035    Blood Urine Large (A) Negative    pH Urine 5.5 5.0 - 7.0    Protein Albumin Urine 50  (A) Negative mg/dL    Urobilinogen Urine 2.0 Normal, 2.0 mg/dL    Nitrite Urine Negative Negative    Leukocyte Esterase Urine Negative Negative    Mucus Urine Present (A) None Seen /LPF    RBC Urine 134 (H) <=2 /HPF    WBC Urine 6 (H) <=5 /HPF    Narrative    Urine Culture not indicated   CBC with platelets differential    Narrative    The following orders were created for panel order CBC with platelets differential.  Procedure                               Abnormality         Status                     ---------                               -----------         ------                     CBC with platelets and d...[[id number]]                      Final result                 Please view results for these tests on the individual orders.   Comprehensive metabolic panel   Result Value Ref Range    Sodium 138 133 - 144 mmol/L    Potassium 3.9 3.4 - 5.3 mmol/L    Chloride 108 94 - 109 mmol/L    Carbon Dioxide (CO2) 26 20 - 32 mmol/L    Anion Gap 4 3 - 14 mmol/L    Urea Nitrogen 15 7 - 30 mg/dL    Creatinine 0.88 0.66 - 1.25 mg/dL    Calcium 8.9 8.5 - 10.1 mg/dL    Glucose 107 (H) 70 - 99 mg/dL    Alkaline Phosphatase 60 40 - 150 U/L    AST 18 0 - 45 U/L    ALT 19 0 - 70 U/L    Protein Total 8.0 6.8 -  8.8 g/dL    Albumin 3.3 (L) 3.4 - 5.0 g/dL    Bilirubin Total 0.6 0.2 - 1.3 mg/dL    GFR Estimate 88 >60 mL/min/1.73m2   CBC with platelets and differential   Result Value Ref Range    WBC Count 7.4 4.0 - 11.0 10e3/uL    RBC Count 4.77 4.40 - 5.90 10e6/uL    Hemoglobin 15.6 13.3 - 17.7 g/dL    Hematocrit 44.3 40.0 - 53.0 %    MCV 93 78 - 100 fL    MCH 32.7 26.5 - 33.0 pg    MCHC 35.2 31.5 - 36.5 g/dL    RDW 13.0 10.0 - 15.0 %    Platelet Count 212 150 - 450 10e3/uL    % Neutrophils 65 %    % Lymphocytes 25 %    % Monocytes 9 %    % Eosinophils 1 %    % Basophils 0 %    % Immature Granulocytes 0 %    NRBCs per 100 WBC 0 <1 /100    Absolute Neutrophils 4.7 1.6 - 8.3 10e3/uL    Absolute Lymphocytes 1.9 0.8 - 5.3 10e3/uL    Absolute Monocytes 0.7 0.0 - 1.3 10e3/uL    Absolute Eosinophils 0.1 0.0 - 0.7 10e3/uL    Absolute Basophils 0.0 0.0 - 0.2 10e3/uL    Absolute Immature Granulocytes 0.0 <=0.0 10e3/uL    Absolute NRBCs 0.0 10e3/uL     Medications - No data to display          Assessments & Plan (with Medical Decision Making)     71 year old male with a past medical history significant for hypertension, elevated prostate specific antigen, hypokalemia, right testicular pain, hernia s/p Davinci herniorrhaphy inguinal (left, 2018) who presents to the Emergency Department for evaluation of hematuria for the past week.  IV established, labs drawn sent reviewed document epic essentially unremarkable normal CBC electrolytes, UA positive for large red cells and negative for nitrites and leukoesterase.  Patient sent to CT for imaging of the abdomen pelvis without contrast which revealed massive prostatomegaly.  Case discussed with urology consult over the phone who will arrange for urology clinic follow-up appointment for further evaluation and care.     I have reviewed the nursing notes.    I have reviewed the findings, diagnosis, plan and need for follow up with the patient.    New Prescriptions    No medications on file        Final diagnoses:   Hematuria, unspecified type   Prostatic enlargement       ITram am serving as a trained medical scribe to document services personally performed by Yokasta Galvin MD, based on the provider's statements to me.      IYokasta MD, was physically present and have reviewed and verified the accuracy of this note documented by Tram Matute.     Yokasta Galvin MD  9/28/2021   [facility] EMERGENCY DEPARTMENT     Yokasta Galvin MD  10/11/21 2962

## 2021-09-28 NOTE — DISCHARGE INSTRUCTIONS
The urology clinic will call you within the next 48 hours to set up an appointment with them.  If you do not hear from urology clinic, please call them (phone: [phone]) and reports the urology resident who consulted on your case in the emergency department requested follow-up in urology clinic as soon as possible.

## 2021-09-28 NOTE — ED TRIAGE NOTES
Pt referred to ED from clinic with c/o of pain with urination and c/o blood in urine for about a week. Pt also stated that he has a small bump near his anus he would like evaluated while he is here. Denies all other symptoms.   Celina Mendoza RN on 9/28/2021 at 12:50 PM

## 2021-09-29 ENCOUNTER — PRE VISIT (OUTPATIENT)
Dept: UROLOGY | Facility: CLINIC | Age: 71
End: 2021-09-29

## 2021-09-29 ENCOUNTER — APPOINTMENT (OUTPATIENT)
Dept: INTERPRETER SERVICES | Facility: CLINIC | Age: 71
End: 2021-09-29
Payer: COMMERCIAL

## 2021-09-29 ENCOUNTER — TELEPHONE (OUTPATIENT)
Dept: UROLOGY | Facility: CLINIC | Age: 71
End: 2021-09-29

## 2021-09-29 NOTE — TELEPHONE ENCOUNTER
Reason for visit: consult     Relevant information: hematuria    Records/imaging/labs/orders: in epic    Pt called: no    At Rooming: normal

## 2021-09-29 NOTE — TELEPHONE ENCOUNTER
Reason for visit: hematuria work up      Relevant information: New consult    Problem list   Patient Active Problem List   Diagnosis     Hypertension goal BP (blood pressure) < 140/90     Pain of right leg     Elevated prostate specific antigen (PSA)     Hypokalemia     Testicular pain Right       Records/imaging/labs/orders: records available    Pt called: Message sent to scheduling team to schedule with the correct provider    At Rooming: collect a urine

## 2021-09-29 NOTE — TELEPHONE ENCOUNTER
----- Message from Krystal Leavitt RN sent at 9/29/2021  8:33 AM CDT -----  Regarding: FW: Hematuria workup  Please schedule an appointment with Palmer NP   Please call patient to schedule  Krystal  ----- Message -----  From: Serafin Kong MD  Sent: 9/29/2021   7:59 AM CDT  To: Krystal Leavitt RN  Subject: Hematuria workup                                 Patient needs a clinic visit with any of our staff for a hematuria workup.     Serafin Kong  PGY-2  [phone]

## 2021-10-08 ENCOUNTER — OFFICE VISIT (OUTPATIENT)
Dept: UROLOGY | Facility: CLINIC | Age: 71
End: 2021-10-08
Payer: COMMERCIAL

## 2021-10-08 VITALS — DIASTOLIC BLOOD PRESSURE: 82 MMHG | SYSTOLIC BLOOD PRESSURE: 150 MMHG | HEART RATE: 67 BPM

## 2021-10-08 DIAGNOSIS — R31.0 GROSS HEMATURIA: ICD-10-CM

## 2021-10-08 DIAGNOSIS — N20.0 KIDNEY STONE: ICD-10-CM

## 2021-10-08 DIAGNOSIS — R97.20 ELEVATED PROSTATE SPECIFIC ANTIGEN (PSA): Primary | ICD-10-CM

## 2021-10-08 LAB
ALBUMIN UR-MCNC: 100 MG/DL
APPEARANCE UR: CLEAR
BILIRUB UR QL STRIP: NEGATIVE
COLOR UR AUTO: YELLOW
GLUCOSE UR STRIP-MCNC: NEGATIVE MG/DL
HGB UR QL STRIP: NEGATIVE
KETONES UR STRIP-MCNC: NEGATIVE MG/DL
LEUKOCYTE ESTERASE UR QL STRIP: NEGATIVE
MUCOUS THREADS #/AREA URNS LPF: PRESENT /LPF
NITRATE UR QL: NEGATIVE
PH UR STRIP: 5 [PH] (ref 5–7)
RBC URINE: 1 /HPF
SP GR UR STRIP: 1.02 (ref 1–1.03)
UROBILINOGEN UR STRIP-MCNC: NORMAL MG/DL
WBC URINE: 1 /HPF

## 2021-10-08 PROCEDURE — 88112 CYTOPATH CELL ENHANCE TECH: CPT | Mod: TC | Performed by: UROLOGY

## 2021-10-08 PROCEDURE — 88120 CYTP URNE 3-5 PROBES EA SPEC: CPT | Mod: 26 | Performed by: PATHOLOGY

## 2021-10-08 PROCEDURE — 88120 CYTP URNE 3-5 PROBES EA SPEC: CPT | Mod: TC | Performed by: UROLOGY

## 2021-10-08 PROCEDURE — 88112 CYTOPATH CELL ENHANCE TECH: CPT | Mod: 26 | Performed by: PATHOLOGY

## 2021-10-08 PROCEDURE — 81001 URINALYSIS AUTO W/SCOPE: CPT | Performed by: PATHOLOGY

## 2021-10-08 PROCEDURE — 99204 OFFICE O/P NEW MOD 45 MIN: CPT | Performed by: UROLOGY

## 2021-10-08 RX ORDER — POTASSIUM CITRATE 10 MEQ/1
10 TABLET, EXTENDED RELEASE ORAL 2 TIMES DAILY WITH MEALS
Qty: 60 TABLET | Refills: 11 | Status: SHIPPED | OUTPATIENT
Start: 2021-10-08 | End: 2022-09-23

## 2021-10-08 RX ORDER — ATORVASTATIN CALCIUM 40 MG/1
TABLET, FILM COATED ORAL
COMMUNITY
Start: 2021-03-25

## 2021-10-08 RX ORDER — AMLODIPINE BESYLATE 10 MG/1
TABLET ORAL
COMMUNITY
Start: 2021-07-05

## 2021-10-08 RX ORDER — FINASTERIDE 5 MG/1
5 TABLET, FILM COATED ORAL DAILY
Qty: 30 TABLET | Refills: 11 | Status: SHIPPED | OUTPATIENT
Start: 2021-10-08 | End: 2022-09-23

## 2021-10-08 ASSESSMENT — PAIN SCALES - GENERAL: PAINLEVEL: NO PAIN (0)

## 2021-10-08 NOTE — PATIENT INSTRUCTIONS
Please see Dr. Lagos on 11/12/21 at 9:40am for an in-person visit with CT and lab prior.     It was a pleasure meeting with you today.  Thank you for allowing me and my team the privilege of caring for you today.  YOU are the reason we are here, and I truly hope we provided you with the excellent service you deserve.  Please let us know if there is anything else we can do for you so that we can be sure you are leaving completely satisfied with your care experience.

## 2021-10-08 NOTE — LETTER
10/8/2021       RE: William Kapoor  [address]  [facility] [address]     Dear Colleague,    Thank you for referring your patient, William Kapoor, to the [facility] UROLOGY CLINIC [place] at [facility]. Please see a copy of my visit note below.      Urology Clinic  MARIO Lagos MD    Oct 8, 2021  71 year old male    Gross Hemturia. Complicated by massive prostate. Uncertain prognosis.    Plan for ua/uc, urine cytology, CT Urogram    Follow-up in 3-4 weeks with repeat PSA prior, possible cystoscopy.    Proscar    Schistosomiasis screening    He prefers prostate exam at next visit.    Elevated PSA.  Possible high risk Prostate Cancer.  -Plan for repeat PSA in 2-3 weeks.    Right kidney stone.  3-4mm.  Possible uric acid stone.    During counseling for this visit, we covered the natural history of kidney stones, the risk of progression to symptomatic pain/infection, and the possibility of renal failure/kidney damage.  We covered treatment options including observation, ureteroscopy, extracorporeal shock wave lithotripsy (ESWL), and percutaneous nephrolithotomy (PCNL).  We covered surgical risks which include but are not limited to heart attack, stroke, blood clot in the legs or lungs, death, injury to surrounding organs (intestine, liver, spleen, pancreas, lung, muscles, nerves), loss of sensation around incisions, decreased renal function, infection, injury to ureter, injury to bladder, and urethral strictures.  Additional procedures may be necessary in the perioperative period.  We discussed the risks of blood transfusion including HIV and hepatitis.   There are other additional unexpected complications which may occur.  Plan  -Plan for observation.  -Repeat urinalysis today.  If acidic pH then continue urocitK.    ______________________________________________________________________________    CHIEF COMPLAINT  It was my pleasure to see William JORGE  Antwon who is a 71 year old male for evaluation of gross hematuria.      HPI  He has gross hematuria for 2 weeks.  This is a first occurrence.  He denies any dysuria, but he does have intermittent stream.      No family history of bladder cancer.    This is a select list of notes I reviewed during this visit.  There may be additional notes I reviewed which are not listed:    9/28/21 ED note Dr Galvin.      9/28/21 CT Abdomen/Pelvis without contrast   I reviewed the radiologic images and report from this radiologic exam.  My independent interpretation is:    3-4 mm right kidney stone, possible uric acid.  Massive prostate.    Radiologist Impression  1.Nonobstructing calyceal stone in the interpolar right kidney  measuring 3 x 2 x 4 mm. No hydronephrosis.  2.. Mild fat stranding and prominent lymph node adjacent to the  bladder, which may be seen with cystitis.  3. Massive prostatomegaly. May consider correlation with  prostate-specific antigen levels.  4. Sequelae of prior granulomatous disease.      I reviewed the following laboratory data and went over findings with patient:  Recent Labs   Lab Test 09/28/21  1349   WBC 7.4   HGB 15.6        Recent Labs   Lab Test 09/28/21  1349 04/20/15  1016 03/31/15  1033   CR 0.88 0.84 0.85   GFRESTIMATED 88 >90  Non  GFR Calc   >90  Non  GFR Calc     GFRESTBLACK  --  >90   GFR Calc   >90   GFR Calc     * 102* 91     Recent Labs   Lab Test 09/28/21  1308   COLOR Yellow   APPEARANCE Clear   URINEGLC Negative   URINEBILI Negative   URINEKETONE Negative   SG 1.028   UBLD Large*   URINEPH 5.5   PROTEIN 50 *   NITRITE Negative   LEUKEST Negative   RBCU 134*   WBCU 6*         CC  Patient Care Team:  [facility] as PCP - General  SELF, REFERRED    Copy to patient  JAGDEEP ANIA GOTTLIEB  [address]  [facility] [address]

## 2021-10-08 NOTE — NURSING NOTE
Chief Complaint   Patient presents with     Consult     hematuria       Blood pressure (!) 150/82, pulse 67. There is no height or weight on file to calculate BMI.    Patient Active Problem List   Diagnosis     Hypertension goal BP (blood pressure) < 140/90     Pain of right leg     Elevated prostate specific antigen (PSA)     Hypokalemia     Testicular pain Right       No Known Allergies    Current Outpatient Medications   Medication Sig Dispense Refill     amLODIPine (NORVASC) 10 MG tablet        amoxicillin-clavulanate (AUGMENTIN) 875-125 MG tablet        ASPIRIN PO        atorvastatin (LIPITOR) 40 MG tablet        cloNIDine (CATAPRES) 0.1 MG tablet Take 0.1 mg by mouth 2 times daily       HYDROcodone-acetaminophen (NORCO) 5-325 MG per tablet Take 1-2 tablets by mouth every 4 hours as needed for moderate to severe pain 15 tablet 0     metoprolol (TOPROL-XL) 25 MG 24 hr tablet Take 0.5 tablets (12.5 mg) by mouth daily 45 tablet 3     Multiple Vitamins-Minerals (MULTIVITAMIN ADULT) CHEW        omeprazole (PRILOSEC) 40 MG capsule Take 1 capsule (40 mg) by mouth daily Take 30-60 minutes before a meal. 30 capsule 1     ORDER FOR DME BP cuff, brand as covered by insurance.  Dx: HTN 1 each 0     senna-docusate (SENOKOT-S;PERICOLACE) 8.6-50 MG per tablet Take 1-2 tablets by mouth 2 times daily 30 tablet 0       Social History     Tobacco Use     Smoking status: Never Smoker     Smokeless tobacco: Never Used   Substance Use Topics     Alcohol use: No     Drug use: No       Jair Ortiz  10/8/2021  8:22 AM

## 2021-10-12 LAB
PATH REPORT.COMMENTS IMP SPEC: NORMAL
PATH REPORT.FINAL DX SPEC: NORMAL
PATH REPORT.GROSS SPEC: NORMAL
PATH REPORT.RELEVANT HX SPEC: NORMAL

## 2021-10-22 LAB
PATH REPORT.COMMENTS IMP SPEC: NORMAL
PATH REPORT.FINAL DX SPEC: NORMAL
PATH REPORT.GROSS SPEC: NORMAL
PATH REPORT.MICROSCOPIC SPEC OTHER STN: NORMAL
PATH REPORT.RELEVANT HX SPEC: NORMAL

## 2021-11-09 ENCOUNTER — PRE VISIT (OUTPATIENT)
Dept: UROLOGY | Facility: CLINIC | Age: 71
End: 2021-11-09
Payer: COMMERCIAL

## 2021-11-09 NOTE — TELEPHONE ENCOUNTER
Reason for visit: follow up     Relevant information: elevated PSA    Records/imaging/labs/orders: in epic    Pt called: no    At Rooming: normal

## 2021-11-12 ENCOUNTER — OFFICE VISIT (OUTPATIENT)
Dept: UROLOGY | Facility: CLINIC | Age: 71
End: 2021-11-12
Payer: COMMERCIAL

## 2021-11-12 ENCOUNTER — LAB (OUTPATIENT)
Dept: LAB | Facility: CLINIC | Age: 71
End: 2021-11-12
Attending: UROLOGY
Payer: COMMERCIAL

## 2021-11-12 ENCOUNTER — ANCILLARY PROCEDURE (OUTPATIENT)
Dept: CT IMAGING | Facility: CLINIC | Age: 71
End: 2021-11-12
Attending: UROLOGY
Payer: COMMERCIAL

## 2021-11-12 VITALS — HEART RATE: 75 BPM | DIASTOLIC BLOOD PRESSURE: 75 MMHG | SYSTOLIC BLOOD PRESSURE: 170 MMHG

## 2021-11-12 DIAGNOSIS — R31.0 GROSS HEMATURIA: ICD-10-CM

## 2021-11-12 DIAGNOSIS — R97.20 ELEVATED PROSTATE SPECIFIC ANTIGEN (PSA): Primary | ICD-10-CM

## 2021-11-12 LAB
CREAT BLD-MCNC: 1 MG/DL (ref 0.7–1.3)
GFR SERPL CREATININE-BSD FRML MDRD: >60 ML/MIN/1.73M2

## 2021-11-12 PROCEDURE — 36415 COLL VENOUS BLD VENIPUNCTURE: CPT | Performed by: PATHOLOGY

## 2021-11-12 PROCEDURE — 86682 HELMINTH ANTIBODY: CPT | Mod: 90 | Performed by: PATHOLOGY

## 2021-11-12 PROCEDURE — 99000 SPECIMEN HANDLING OFFICE-LAB: CPT | Performed by: PATHOLOGY

## 2021-11-12 PROCEDURE — 74178 CT ABD&PLV WO CNTR FLWD CNTR: CPT | Performed by: RADIOLOGY

## 2021-11-12 PROCEDURE — 99214 OFFICE O/P EST MOD 30 MIN: CPT | Performed by: UROLOGY

## 2021-11-12 RX ORDER — IOPAMIDOL 755 MG/ML
135 INJECTION, SOLUTION INTRAVASCULAR ONCE
Status: COMPLETED | OUTPATIENT
Start: 2021-11-12 | End: 2021-11-12

## 2021-11-12 RX ADMIN — IOPAMIDOL 135 ML: 755 INJECTION, SOLUTION INTRAVASCULAR at 09:21

## 2021-11-12 ASSESSMENT — PAIN SCALES - GENERAL: PAINLEVEL: NO PAIN (0)

## 2021-11-12 NOTE — PATIENT INSTRUCTIONS
Please continue Urocit K. Please follow up with Dr. Lagos in January for a cystoscopy with a PSA beforehand.    It was a pleasure meeting with you today.  Thank you for allowing me and my team the privilege of caring for you today.  YOU are the reason we are here, and I truly hope we provided you with the excellent service you deserve.  Please let us know if there is anything else we can do for you so that we can be sure you are leaving completely satisfied with your care experience.

## 2021-11-12 NOTE — PROGRESS NOTES
Urology Clinic  MARIO Lagos MD    Nov 12, 2021  71 year old male    Gross Hemturia. Complicated by massive prostate. Uncertain prognosis.  FISH and Cytology Negative  Schistosomiasis pending  -Cystoscopy at next visit.      Elevated PSA.  Possible high risk Prostate Cancer.  PSA 15  -Prostate MRI and then follow-up to review    Right kidney stone.  3-4mm.  Possible uric acid stone.    -Plan for observation.  -Continue Urocit K.    ______________________________________________________________________________  HPI  He has gross hematuria for 2 weeks.  This is a first occurrence.  He denies any dysuria, but he does have intermittent stream.      No family history of bladder cancer.    11/12/21  No new concerns.  No additional hematuria since proscar started.    This is a select list of notes I reviewed during this visit.  There may be additional notes I reviewed which are not listed:    9/28/21 ED note Dr Galvin.    10/8/21 FISH  Negative    11/12/21 CT Urogram   I reviewed the radiologic images and report from this radiologic exam.  My independent interpretation is:    3mm right stone    Massive prostate.    Radiologist Impression  1.  Nonobstructing right renal calculus. No hydroureteronephrosis. No  suspicious renal mass.  2.  Markedly enlarged prostate.  3.  Bladder wall thickening can be seen with chronic outlet  obstruction or cystitis, however neoplasm cannot be excluded.           I reviewed the following laboratory data and went over findings with patient:  Recent Labs   Lab Test 09/28/21  1349   WBC 7.4   HGB 15.6        Recent Labs   Lab Test 09/28/21  1349 04/20/15  1016 03/31/15  1033   CR 0.88 0.84 0.85   GFRESTIMATED 88 >90  Non  GFR Calc   >90  Non  GFR Calc     GFRESTBLACK  --  >90   GFR Calc   >90   GFR Calc     * 102* 91     Recent Labs   Lab Test 09/28/21  1308   COLOR Yellow   APPEARANCE Clear   URINEGLC Negative    URINEBILI Negative   URINEKETONE Negative   SG 1.028   UBLD Large*   URINEPH 5.5   PROTEIN 50 *   NITRITE Negative   LEUKEST Negative   RBCU 134*   WBCU 6*       10/4/21 PSA 15.34    PSA   Date Value Ref Range Status   03/31/2015 11.67 (H) 0 - 4 ug/L Final   06/15/2009 7.34 (H) 0 - 4 ug/L Final   11/06/2008 8.48 (H) 0 - 4 ug/L Final   08/04/2008 6.07 (H) 0 - 4 ug/L Final   10/10/2005 4.36 (H) 0 - 4 ug/L Final     No components found for: LUS670      CC  Patient Care Team:  [facility] as PCP - General Lagos, Eric Goetz MD as Assigned Surgical Provider  SELF, REFERRED    Copy to patient  JAGDEEP GOTTLIEB  [address]  [facility] [address]

## 2021-11-12 NOTE — LETTER
11/12/2021       RE: William Kapoor  [address]  [facility] [address]     Dear Colleague,    Thank you for referring your patient, William Kapoor, to the [facility] UROLOGY CLINIC [place] at [facility]. Please see a copy of my visit note below.      Urology Clinic  MARIO Lagos MD    Nov 12, 2021  71 year old male    Gross Hemturia. Complicated by massive prostate. Uncertain prognosis.  FISH and Cytology Negative  Schistosomiasis pending  -Cystoscopy at next visit.      Elevated PSA.  Possible high risk Prostate Cancer.  PSA 15  -Prostate MRI and then follow-up to review    Right kidney stone.  3-4mm.  Possible uric acid stone.    -Plan for observation.  -Continue Urocit K.    ______________________________________________________________________________  HPI  He has gross hematuria for 2 weeks.  This is a first occurrence.  He denies any dysuria, but he does have intermittent stream.      No family history of bladder cancer.    11/12/21  No new concerns.  No additional hematuria since proscar started.    This is a select list of notes I reviewed during this visit.  There may be additional notes I reviewed which are not listed:    9/28/21 ED note Dr Galvin.    10/8/21 FISH  Negative    11/12/21 CT Urogram   I reviewed the radiologic images and report from this radiologic exam.  My independent interpretation is:    3mm right stone    Massive prostate.    Radiologist Impression  1.  Nonobstructing right renal calculus. No hydroureteronephrosis. No  suspicious renal mass.  2.  Markedly enlarged prostate.  3.  Bladder wall thickening can be seen with chronic outlet  obstruction or cystitis, however neoplasm cannot be excluded.           I reviewed the following laboratory data and went over findings with patient:  Recent Labs   Lab Test 09/28/21  1349   WBC 7.4   HGB 15.6        Recent Labs   Lab Test 09/28/21  1349 04/20/15  1016  03/31/15  1033   CR 0.88 0.84 0.85   GFRESTIMATED 88 >90  Non  GFR Calc   >90  Non  GFR Calc     GFRESTBLACK  --  >90   GFR Calc   >90   GFR Calc     * 102* 91     Recent Labs   Lab Test 09/28/21  1308   COLOR Yellow   APPEARANCE Clear   URINEGLC Negative   URINEBILI Negative   URINEKETONE Negative   SG 1.028   UBLD Large*   URINEPH 5.5   PROTEIN 50 *   NITRITE Negative   LEUKEST Negative   RBCU 134*   WBCU 6*       10/4/21 PSA 15.34    PSA   Date Value Ref Range Status   03/31/2015 11.67 (H) 0 - 4 ug/L Final   06/15/2009 7.34 (H) 0 - 4 ug/L Final   11/06/2008 8.48 (H) 0 - 4 ug/L Final   08/04/2008 6.07 (H) 0 - 4 ug/L Final   10/10/2005 4.36 (H) 0 - 4 ug/L Final     No components found for: [id number]      CC  Patient Care Team:  [facility] as PCP - General Lagos, Eric Goetz MD as Assigned Surgical Provider  SELF, REFERRED    Copy to patient  JASONMAURICIO ANIA GOTTLIEB  [address]  [facility] [address]

## 2021-11-12 NOTE — NURSING NOTE
Chief Complaint   Patient presents with     Follow Up       Blood pressure (!) 170/75, pulse 75. There is no height or weight on file to calculate BMI.    Patient Active Problem List   Diagnosis     Hypertension goal BP (blood pressure) < 140/90     Pain of right leg     Elevated prostate specific antigen (PSA)     Hypokalemia     Testicular pain Right       No Known Allergies    Current Outpatient Medications   Medication Sig Dispense Refill     amLODIPine (NORVASC) 10 MG tablet        amoxicillin-clavulanate (AUGMENTIN) 875-125 MG tablet        ASPIRIN PO        atorvastatin (LIPITOR) 40 MG tablet        cloNIDine (CATAPRES) 0.1 MG tablet Take 0.1 mg by mouth 2 times daily       finasteride (PROSCAR) 5 MG tablet Take 1 tablet (5 mg) by mouth daily 30 tablet 11     HYDROcodone-acetaminophen (NORCO) 5-325 MG per tablet Take 1-2 tablets by mouth every 4 hours as needed for moderate to severe pain 15 tablet 0     metoprolol (TOPROL-XL) 25 MG 24 hr tablet Take 0.5 tablets (12.5 mg) by mouth daily 45 tablet 3     Multiple Vitamins-Minerals (MULTIVITAMIN ADULT) CHEW        omeprazole (PRILOSEC) 40 MG capsule Take 1 capsule (40 mg) by mouth daily Take 30-60 minutes before a meal. 30 capsule 1     ORDER FOR DME BP cuff, brand as covered by insurance.  Dx: HTN 1 each 0     potassium citrate (UROCIT-K) 10 MEQ (1080 MG) CR tablet Take 1 tablet (10 mEq) by mouth 2 times daily (with meals) 60 tablet 11     senna-docusate (SENOKOT-S;PERICOLACE) 8.6-50 MG per tablet Take 1-2 tablets by mouth 2 times daily 30 tablet 0       Social History     Tobacco Use     Smoking status: Never Smoker     Smokeless tobacco: Never Used   Substance Use Topics     Alcohol use: No     Drug use: No       Jair Ortiz  11/12/2021  10:14 AM

## 2021-11-13 ENCOUNTER — ANCILLARY PROCEDURE (OUTPATIENT)
Dept: MRI IMAGING | Facility: CLINIC | Age: 71
End: 2021-11-13
Attending: UROLOGY
Payer: COMMERCIAL

## 2021-11-13 DIAGNOSIS — R97.20 ELEVATED PROSTATE SPECIFIC ANTIGEN (PSA): ICD-10-CM

## 2021-11-13 PROCEDURE — 72197 MRI PELVIS W/O & W/DYE: CPT | Mod: 26 | Performed by: RADIOLOGY

## 2021-11-13 PROCEDURE — A9585 GADOBUTROL INJECTION: HCPCS | Performed by: RADIOLOGY

## 2021-11-13 RX ORDER — GADOBUTROL 604.72 MG/ML
10 INJECTION INTRAVENOUS ONCE
Status: COMPLETED | OUTPATIENT
Start: 2021-11-13 | End: 2021-11-13

## 2021-11-13 RX ADMIN — GADOBUTROL 10 ML: 604.72 INJECTION INTRAVENOUS at 13:27

## 2021-11-13 NOTE — LETTER
December 6, 2021      William Kapoor  [address]  [facility] [address]        Dear ,    We are writing to inform you of your test results.    Your test results fall within the expected range(s) or remain unchanged from previous results.  Please continue with current treatment plan.    Resulted Orders   MR Prostate wo & w Contrast    Narrative    MRI PROSTATE: 11/13/2021 2:01 PM    CLINICAL HISTORY: Elevated prostate specific antigen (PSA)    Most Recent PSA: 11.67 ug/L    Comparison: No similar study.    TECHNIQUE:  The following sequences were obtained: High-resolution axial  T2-weighted, coronal T2-weighted, 3D volumetric T2-weighted, axial  pre-contrast T1, axial diffusion-weighted, axial apparent diffusion  coefficient and axial dynamic contrast-enhanced T1. Postcontrast  images were evaluated on a separate workstation to evaluate dynamic  contrast enhancement. The technique of this exam is PI-RADS v2.1  compliant. Contrast dose: 10 mL Gadavist    FINDINGS:  Size: 198 grams  Hemorrhage: Mild  Peripheral zone: Heterogeneous on T2-weighted images. Regions of  mildly decreased signal on ADC or DWI which are best characterized as  PI-RADS 2 without highly suspicious lesion. The peripheral zone is  markedly compressed due to extensive BPH changes.  Transition zone: Enlarged with BPH changes. Transition zone nodules  which are circumscribed or mostly encapsulated without diffusion  restriction.  PI-RADS 2.  No highly suspicious nodules. Prominent  median lobe of the prostate impressing the urinary bladder floor.    Neurovascular bundles: No neurovascular bundle involvement by  malignancy.  Seminal vesicles: No seminal vesicle involvement by malignancy.  Lymph nodes: No lymph node involvement  Bones: No suspicious lesions  Other pelvic organs: Partially distended urinary bladder with wall  thickening and trabeculation which could be related to chronic outlet  obstructive changes.      Impression     IMPRESSION:  1. Based on the most suspicious abnormality, this exam is  characterized as PIRADS 2 - Clinically significant cancer is unlikely  to be present.  2. No suspicious adenopathy or evidence of pelvic metastases.      PIRADS? v2.1 Assessment Categories   PIRADS 1: Very low (clinically significant cancer is highly unlikely  to be present)   PIRADS 2: Low (clinically significant cancer is unlikely to be  present)   PIRADS 3: Intermediate (the presence of clinically significant cancer  is equivocal)   PIRADS 4: High (clinically significant cancer is likely to be present)    PIRADS 5: Very high (clinically significant cancer is highly likely to  be present)               RASHAD LUO MD         SYSTEM ID:  [id number]       Mr. Kapoor,   Your MRI shows that prostate cancer is unlikely to be present.  While this is reassuring, we should still plan to do the cystoscopy procedure in the near future and then check your PSA in 6 months.  Thank you, Bishnu Lagos.

## 2021-11-15 ENCOUNTER — TELEPHONE (OUTPATIENT)
Dept: UROLOGY | Facility: CLINIC | Age: 71
End: 2021-11-15

## 2021-11-15 NOTE — TELEPHONE ENCOUNTER
[facility] Call Center    Phone Message    May a detailed message be left on voicemail: yes     Reason for Call: Other: Caller is asking for the Pt's MRI results and to reschedule missed Cystoscopy with Dr. Lagos today for sooner than what I could offer for next available.  Caller asked if Pt can be seen with someone else for the cystoscopy but office notes stated a follow up with Dr. Lagos.  Please review and follow up.  Thank you.     Action Taken: Message routed to:  [facility] Uro    Travel Screening: Not Applicable

## 2021-11-17 NOTE — TELEPHONE ENCOUNTER
Patient called thru brenda and message was left regarding his MRI results and his cysto set up for jan 14th at 8am  this was done with   services Erinn Gomez LPN Staff Nurse

## 2021-11-18 LAB — SCHISTOSOMA IGG SER QL: NEGATIVE

## 2021-11-19 ENCOUNTER — TELEPHONE (OUTPATIENT)
Dept: UROLOGY | Facility: CLINIC | Age: 71
End: 2021-11-19
Payer: COMMERCIAL

## 2021-11-19 NOTE — TELEPHONE ENCOUNTER
[facility] Call Center    Phone Message    May a detailed message be left on voicemail: yes     Reason for Call: Other: Pt son would like a call back as he stated they did not get a voivemail regarding results or new appt and would like to discuss     Action Taken: Message routed to:  [facility] Uro    Travel Screening: Not Applicable

## 2021-11-19 NOTE — TELEPHONE ENCOUNTER
[facility] Call Center    Phone Message    May a detailed message be left on voicemail: yes     Reason for Call: Other: Son called in again. States that dad is very worried about the results and would like a c/b asap if possible     Action Taken: Message routed to:  [facility] uro    Travel Screening: Not Applicable

## 2021-11-22 NOTE — TELEPHONE ENCOUNTER
Called son and spoke to him gave him mri results and info on upcoming appointment for his father Erinn Gomez LPN Staff Nurse

## 2021-12-05 NOTE — RESULT ENCOUNTER NOTE
Mr. Kapoor,   Your MRI shows that prostate cancer is unlikely to be present.  While this is reassuring, we should still plan to do the cystoscopy procedure in the near future and then check your PSA in 6 months.  Thank you, Bishnu Lagos.

## 2022-01-13 ENCOUNTER — PRE VISIT (OUTPATIENT)
Dept: UROLOGY | Facility: CLINIC | Age: 72
End: 2022-01-13
Payer: COMMERCIAL

## 2022-01-13 NOTE — TELEPHONE ENCOUNTER
Reason for visit: follow up, cysto     Relevant information: elevated PSA    Records/imaging/labs/orders: in epic    Pt called: no    At Rooming: normal

## 2022-05-04 ENCOUNTER — APPOINTMENT (OUTPATIENT)
Dept: INTERPRETER SERVICES | Facility: CLINIC | Age: 72
End: 2022-05-04
Payer: COMMERCIAL

## 2022-05-05 ENCOUNTER — HOSPITAL ENCOUNTER (OUTPATIENT)
Dept: GENERAL RADIOLOGY | Facility: CLINIC | Age: 72
Discharge: HOME | End: 2022-05-05
Attending: NURSE PRACTITIONER | Admitting: NURSE PRACTITIONER
Payer: COMMERCIAL

## 2022-05-05 DIAGNOSIS — R76.12 POSITIVE QUANTIFERON-TB GOLD TEST: ICD-10-CM

## 2022-05-05 PROCEDURE — 71046 X-RAY EXAM CHEST 2 VIEWS: CPT

## 2022-05-05 PROCEDURE — 71046 X-RAY EXAM CHEST 2 VIEWS: CPT | Mod: 26 | Performed by: RADIOLOGY

## 2022-06-07 NOTE — MR AVS SNAPSHOT
After Visit Summary   12/3/2018    William Kapoor    MRN: [id number]           Patient Information     Date Of Birth          1950        Visit Information        Provider Department      12/3/2018 9:15 AM Jose Chahal MD; [facility]O [facility] General Surgery        Today's Diagnoses     Postoperative state    -  1      Care Instructions    You met with Dr. Jose Chahal.      Today's visit instructions:    Return to the Surgery Clinic on an as needed basis.        If you have questions please contact Rolo RN or Nathaly RN during regular clinic hours, Monday through Friday 7:30 AM - 4:00 PM, or you can contact us via [employer] at anytime.       If you have urgent needs after-hours, weekends, or holidays please call the hospital at [phone] and ask to speak with our on-call General Surgery Team.    Appointment [phone], option #1   Nurse Advice (Rolo or Nathaly): [phone]   Surgery Scheduler ([place]): [phone]  Fax: [phone]                    Follow-ups after your visit        Who to contact     Please call your clinic at [phone] to:    Ask questions about your health    Make or cancel appointments    Discuss your medicines    Learn about your test results    Speak to your doctor            Additional Information About Your Visit        Care EveryWhere ID     This is your Care EveryWhere ID. This could be used by other organizations to access your [facility] medical records  [id number]        Your Vitals Were     Pulse Temperature Pulse Oximetry BMI (Body Mass Index)          73 97.9  F (36.6  C) (Oral) 100% 30.05 kg/m2         Blood Pressure from Last 3 Encounters:   18 141/76   18 120/67   18 147/82    Weight from Last 3 Encounters:   18 209 lb 6.4 oz   18 207 lb 14.3 oz   18 210 lb 9.6 oz              Today, you had the following     No orders found for display       Primary Care Provider Office Phone #  [phone] [phone]  Sedation/Analgesia Post Sedation Record    Pt Name: Michelle Nava  MRN: [id number]  YOB: 1991  Procedure Performed By: Itzel Madrigal DO  Primary Care Physician: FIORDALIZA Smith - ELIZABETH    POST-PROCEDURE    Physicians/Assistants: Itzel Madrigal DO  Procedure Performed: See Procedure Note   Sedation/Anesthesia: Versed and Fentanyl (See procedure note for amount and duration)  Estimated Blood Loss:     0  ml  Specimens Removed: None        Complications: None           Derik Sherryle Blue, DO  Electronically signed 6/7/2022 at 2:53 PM Fax #    [facility] [phone]       [address]  [facility] [address]        Equal Access to Services     ROBERT WRIGHT : Hadii aad ku hadmargaritobirdie Luna, ioanastiven manjarreztysonha, kiki sarymaria l bowen, jenny gonzalez glenjose villarreal toshia sanders. So [facility] [phone].    ATENCIÓN: Si habla español, tiene a molina disposición servicios gratuitos de asistencia lingüística. Llame al [phone].    We comply with applicable federal civil rights laws and [place] laws. We do not discriminate on the basis of race, color, national origin, age, disability, sex, sexual orientation, or gender identity.            Thank you!     Thank you for choosing [facility]  for your care. Our goal is always to provide you with excellent care. Hearing back from our patients is one way we can continue to improve our services. Please take a few minutes to complete the written survey that you may receive in the mail after your visit with us. Thank you!             Your Updated Medication List - Protect others around you: Learn how to safely use, store and throw away your medicines at www.disposemymeds.org.          This list is accurate as of 12/3/18  1:07 PM.  Always use your most recent med list.                   Brand Name Dispense Instructions for use Diagnosis    ASPIRIN PO           cloNIDine 0.1 MG tablet    CATAPRES     Take 0.1 mg by mouth 2 times daily        HYDROcodone-acetaminophen 5-325 MG tablet    NORCO    15 tablet    Take 1-2 tablets by mouth every 4 hours as needed for moderate to severe pain    Left inguinal hernia       metoprolol succinate ER 25 MG 24 hr tablet    TOPROL-XL    45 tablet    Take 0.5 tablets (12.5 mg) by mouth daily    Hypertension goal BP (blood pressure) < 140/90       MULTIVITAMIN ADULT Chew           omeprazole 40 MG DR capsule    priLOSEC    30 capsule    Take 1 capsule (40 mg) by mouth daily Take 30-60 minutes before a meal.    Gastritis       order for  DME     1 each    BP cuff, brand as covered by insurance.  Dx: HTN    Hypertension goal BP (blood pressure) < 140/90       senna-docusate 8.6-50 MG tablet    SENOKOT-S/PERICOLACE    30 tablet    Take 1-2 tablets by mouth 2 times daily    Left inguinal hernia

## 2022-09-14 ENCOUNTER — PRE VISIT (OUTPATIENT)
Dept: UROLOGY | Facility: CLINIC | Age: 72
End: 2022-09-14

## 2022-09-23 ENCOUNTER — OFFICE VISIT (OUTPATIENT)
Dept: UROLOGY | Facility: CLINIC | Age: 72
End: 2022-09-23
Payer: COMMERCIAL

## 2022-09-23 DIAGNOSIS — N20.0 KIDNEY STONE: ICD-10-CM

## 2022-09-23 DIAGNOSIS — R39.15 URINARY URGENCY: Primary | ICD-10-CM

## 2022-09-23 DIAGNOSIS — R31.0 GROSS HEMATURIA: ICD-10-CM

## 2022-09-23 PROCEDURE — 99214 OFFICE O/P EST MOD 30 MIN: CPT | Performed by: UROLOGY

## 2022-09-23 RX ORDER — POTASSIUM CITRATE 10 MEQ/1
10 TABLET, EXTENDED RELEASE ORAL 2 TIMES DAILY WITH MEALS
Qty: 60 TABLET | Refills: 11 | Status: SHIPPED | OUTPATIENT
Start: 2022-09-23

## 2022-09-23 RX ORDER — FINASTERIDE 5 MG/1
5 TABLET, FILM COATED ORAL DAILY
Qty: 30 TABLET | Refills: 11 | Status: SHIPPED | OUTPATIENT
Start: 2022-09-23

## 2022-09-23 RX ORDER — TAMSULOSIN HYDROCHLORIDE 0.4 MG/1
0.4 CAPSULE ORAL DAILY
Qty: 30 CAPSULE | Refills: 11 | Status: SHIPPED | OUTPATIENT
Start: 2022-09-23

## 2022-09-23 NOTE — LETTER
9/23/2022       RE: William Kapoor  [address]  [facility] [address]     Dear Colleague,    Thank you for referring your patient, William Kapoor, to the [facility] [place] [facility]. Please see a copy of my visit note below.      Urology Clinic  MARIO Lagos MD    Sep 23, 2022  71 year old male    Gross Hemturia. Complicated by massive prostate. Uncertain prognosis.    10/8/21 Cytology and FISH negative    11/12/21 Schistosomiasis Negative    11/12/21 CT UROGRAM on my read 3mm right kidney stone, massive prostate.    9/23/22 He wants to defer cystoscopy.  He is aware of risk of bladder cancer.    Elevated PSA.      11/13/21 Prostate MRI PIRADS 2    Kidney stones     Right kidney stone.  3-4mm.  Possible uric acid stone.      LOWER URINARY TRACT SYMPTOMS    9/23/22 Frequency and urge incontinence continue.  Flomax started    Plan    Continue proscar    Continue Urocit K    Start flomax    Follow-up in 1-2 months with PA for symptoms check, repeat urinalysis, and K check.    ______________________________________________________________________________  HPI  He has gross hematuria for 2 weeks.  This is a first occurrence.  He denies any dysuria, but he does have intermittent stream.      No family history of bladder cancer.    11/12/21  No new concerns.  No additional hematuria since proscar started.    9/23/22  I offered cystoscopy or observation.  He does not want cystoscopy even though I explained the risk of bladder cancer.  He is having more frequency and urge incontinence.    Visit was done with interpretor.    This is a select list of notes I reviewed during this visit.  There may be additional notes I reviewed which are not listed:    9/28/21 ED note Dr Galvin.    10/8/21 FISH  Negative    11/12/21 CT Urogram   I reviewed the radiologic images and report from this radiologic exam.  My independent interpretation is:    3mm  right stone    Massive prostate.    Radiologist Impression  1.  Nonobstructing right renal calculus. No hydroureteronephrosis. No  suspicious renal mass.  2.  Markedly enlarged prostate.  3.  Bladder wall thickening can be seen with chronic outlet  obstruction or cystitis, however neoplasm cannot be excluded.           I reviewed the following laboratory data and went over findings with patient:  Recent Labs   Lab Test 09/28/21  1349   WBC 7.4   HGB 15.6        Recent Labs   Lab Test 09/28/21  1349 04/20/15  1016 03/31/15  1033   CR 0.88 0.84 0.85   GFRESTIMATED 88 >90  Non  GFR Calc   >90  Non  GFR Calc     GFRESTBLACK  --  >90   GFR Calc   >90   GFR Calc     * 102* 91     Recent Labs   Lab Test 09/28/21  1308   COLOR Yellow   APPEARANCE Clear   URINEGLC Negative   URINEBILI Negative   URINEKETONE Negative   SG 1.028   UBLD Large*   URINEPH 5.5   PROTEIN 50 *   NITRITE Negative   LEUKEST Negative   RBCU 134*   WBCU 6*       10/4/21 PSA 15.34    PSA   Date Value Ref Range Status   03/31/2015 11.67 (H) 0 - 4 ug/L Final   06/15/2009 7.34 (H) 0 - 4 ug/L Final   11/06/2008 8.48 (H) 0 - 4 ug/L Final   08/04/2008 6.07 (H) 0 - 4 ug/L Final   10/10/2005 4.36 (H) 0 - 4 ug/L Final     No components found for: VIY495    CC  Patient Care Team:  Melly Cornejo NP as PCP - General (Nurse Practitioner)  Eric Lagos MD as Assigned Surgical Provider  Iraida Medina, RN as Registered Nurse (Oncology)      Copy to patient  JAGDEEP ANIA GOTTLIEB  [address]  [facility] [address]    Eric Lagos MD

## 2022-09-23 NOTE — PROGRESS NOTES
Urology Clinic  MARIO Lagos MD    Sep 23, 2022  71 year old male    Gross Hemturia. Complicated by massive prostate. Uncertain prognosis.    10/8/21 Cytology and FISH negative    11/12/21 Schistosomiasis Negative    11/12/21 CT UROGRAM on my read 3mm right kidney stone, massive prostate.    9/23/22 He wants to defer cystoscopy.  He is aware of risk of bladder cancer.    Elevated PSA.      11/13/21 Prostate MRI PIRADS 2    Kidney stones     Right kidney stone.  3-4mm.  Possible uric acid stone.      LOWER URINARY TRACT SYMPTOMS    9/23/22 Frequency and urge incontinence continue.  Flomax started    Plan    Continue proscar    Continue Urocit K    Start flomax    Follow-up in 1-2 months with PA for symptoms check, repeat urinalysis, and K check.    ______________________________________________________________________________  HPI  He has gross hematuria for 2 weeks.  This is a first occurrence.  He denies any dysuria, but he does have intermittent stream.      No family history of bladder cancer.    11/12/21  No new concerns.  No additional hematuria since proscar started.    9/23/22  I offered cystoscopy or observation.  He does not want cystoscopy even though I explained the risk of bladder cancer.  He is having more frequency and urge incontinence.    Visit was done with interpretor.    This is a select list of notes I reviewed during this visit.  There may be additional notes I reviewed which are not listed:    9/28/21 ED note Dr Galvin.    10/8/21 FISH  Negative    11/12/21 CT Urogram   I reviewed the radiologic images and report from this radiologic exam.  My independent interpretation is:    3mm right stone    Massive prostate.    Radiologist Impression  1.  Nonobstructing right renal calculus. No hydroureteronephrosis. No  suspicious renal mass.  2.  Markedly enlarged prostate.  3.  Bladder wall thickening can be seen with chronic outlet  obstruction or cystitis, however neoplasm cannot be  excluded.           I reviewed the following laboratory data and went over findings with patient:  Recent Labs   Lab Test 09/28/21  1349   WBC 7.4   HGB 15.6        Recent Labs   Lab Test 09/28/21  1349 04/20/15  1016 03/31/15  1033   CR 0.88 0.84 0.85   GFRESTIMATED 88 >90  Non  GFR Calc   >90  Non  GFR Calc     GFRESTBLACK  --  >90   GFR Calc   >90   GFR Calc     * 102* 91     Recent Labs   Lab Test 09/28/21  1308   COLOR Yellow   APPEARANCE Clear   URINEGLC Negative   URINEBILI Negative   URINEKETONE Negative   SG 1.028   UBLD Large*   URINEPH 5.5   PROTEIN 50 *   NITRITE Negative   LEUKEST Negative   RBCU 134*   WBCU 6*       10/4/21 PSA 15.34    PSA   Date Value Ref Range Status   03/31/2015 11.67 (H) 0 - 4 ug/L Final   06/15/2009 7.34 (H) 0 - 4 ug/L Final   11/06/2008 8.48 (H) 0 - 4 ug/L Final   08/04/2008 6.07 (H) 0 - 4 ug/L Final   10/10/2005 4.36 (H) 0 - 4 ug/L Final     No components found for: KFR679      CC  Patient Care Team:  Melly Cornejo NP as PCP - General (Nurse Practitioner)  Eric Lagos MD as Assigned Surgical Provider  Iraida Medina, RN as Registered Nurse (Oncology)  SELF, REFERRED    Copy to patient  JAGDEEP GOTTLIEB  [address]  [facility] [address]

## 2023-07-20 ENCOUNTER — MEDICAL CORRESPONDENCE (OUTPATIENT)
Dept: HEALTH INFORMATION MANAGEMENT | Facility: CLINIC | Age: 73
End: 2023-07-20
Payer: COMMERCIAL

## 2023-07-21 ENCOUNTER — TRANSCRIBE ORDERS (OUTPATIENT)
Dept: OTHER | Age: 73
End: 2023-07-21

## 2023-07-21 DIAGNOSIS — R97.20 ELEVATED PROSTATE SPECIFIC ANTIGEN (PSA): Primary | ICD-10-CM

## 2024-05-02 DIAGNOSIS — R39.15 URINARY URGENCY: ICD-10-CM

## 2024-05-02 DIAGNOSIS — N20.0 KIDNEY STONE: ICD-10-CM

## 2024-05-14 RX ORDER — TAMSULOSIN HYDROCHLORIDE 0.4 MG/1
0.4 CAPSULE ORAL DAILY
Qty: 30 CAPSULE | Refills: 11 | OUTPATIENT
Start: 2024-05-14

## 2024-08-19 NOTE — PROGRESS NOTES
Urology Clinic  MARIO Lagos MD    Oct 8, 2021  71 year old male    Gross Hemturia. Complicated by massive prostate. Uncertain prognosis.    Plan for ua/uc, urine cytology, CT Urogram    Follow-up in 3-4 weeks with repeat PSA prior, possible cystoscopy.    Proscar    Schistosomiasis screening    He prefers prostate exam at next visit.    Elevated PSA.  Possible high risk Prostate Cancer.  -Plan for repeat PSA in 2-3 weeks.    Right kidney stone.  3-4mm.  Possible uric acid stone.    During counseling for this visit, we covered the natural history of kidney stones, the risk of progression to symptomatic pain/infection, and the possibility of renal failure/kidney damage.  We covered treatment options including observation, ureteroscopy, extracorporeal shock wave lithotripsy (ESWL), and percutaneous nephrolithotomy (PCNL).  We covered surgical risks which include but are not limited to heart attack, stroke, blood clot in the legs or lungs, death, injury to surrounding organs (intestine, liver, spleen, pancreas, lung, muscles, nerves), loss of sensation around incisions, decreased renal function, infection, injury to ureter, injury to bladder, and urethral strictures.  Additional procedures may be necessary in the perioperative period.  We discussed the risks of blood transfusion including HIV and hepatitis.   There are other additional unexpected complications which may occur.  Plan  -Plan for observation.  -Repeat urinalysis today.  If acidic pH then continue urocitK.    ______________________________________________________________________________    CHIEF COMPLAINT  It was my pleasure to see William Kapoor who is a 71 year old male for evaluation of gross hematuria.      HPI  He has gross hematuria for 2 weeks.  This is a first occurrence.  He denies any dysuria, but he does have intermittent stream.      No family history of bladder cancer.    This is a select list of notes I reviewed during this visit.  There  may be additional notes I reviewed which are not listed:    9/28/21 ED note Dr Galvin.      9/28/21 CT Abdomen/Pelvis without contrast   I reviewed the radiologic images and report from this radiologic exam.  My independent interpretation is:    3-4 mm right kidney stone, possible uric acid.  Massive prostate.    Radiologist Impression  1.Nonobstructing calyceal stone in the interpolar right kidney  measuring 3 x 2 x 4 mm. No hydronephrosis.  2.. Mild fat stranding and prominent lymph node adjacent to the  bladder, which may be seen with cystitis.  3. Massive prostatomegaly. May consider correlation with  prostate-specific antigen levels.  4. Sequelae of prior granulomatous disease.      I reviewed the following laboratory data and went over findings with patient:  Recent Labs   Lab Test 09/28/21  1349   WBC 7.4   HGB 15.6        Recent Labs   Lab Test 09/28/21  1349 04/20/15  1016 03/31/15  1033   CR 0.88 0.84 0.85   GFRESTIMATED 88 >90  Non  GFR Calc   >90  Non  GFR Calc     GFRESTBLACK  --  >90   GFR Calc   >90   GFR Calc     * 102* 91     Recent Labs   Lab Test 09/28/21  1308   COLOR Yellow   APPEARANCE Clear   URINEGLC Negative   URINEBILI Negative   URINEKETONE Negative   SG 1.028   UBLD Large*   URINEPH 5.5   PROTEIN 50 *   NITRITE Negative   LEUKEST Negative   RBCU 134*   WBCU 6*         CC  Patient Care Team:  [place] as PCP - General  SELF, REFERRED    Copy to patient  JAGDEEP GOTTLIEB  [address]  [facility] [address]     Laceration Repair

## 2024-11-14 ENCOUNTER — HOSPITAL ENCOUNTER (OUTPATIENT)
Dept: GENERAL RADIOLOGY | Facility: CLINIC | Age: 74
Discharge: HOME | End: 2024-11-14
Attending: FAMILY MEDICINE
Payer: COMMERCIAL

## 2024-11-14 DIAGNOSIS — R76.12 POSITIVE QUANTIFERON-TB GOLD TEST: ICD-10-CM

## 2024-11-14 PROCEDURE — 71045 X-RAY EXAM CHEST 1 VIEW: CPT

## (undated) DEVICE — TUBING INSUFFLATION W/FILTER 10FT GS1016

## (undated) DEVICE — SUPPORTER ATHLETIC W/KNIT POUCH & LEG STRAP MED 202549

## (undated) DEVICE — DAVINCI S NDL DRIVER MEGA SUTURE CUT 420309

## (undated) DEVICE — DRAPE MAYO STAND 23X54 8337

## (undated) DEVICE — DAVINCI S MONOPOLAR SCISSORS PRECURVED HOT SHEARS 420179

## (undated) DEVICE — SU MONOCRYL 4-0 PS-2 18" UND Y496G

## (undated) DEVICE — DRAPE WARMER 66X44" ORS-300

## (undated) DEVICE — POSITIONER ARMBOARD FOAM 1PAIR LF FP-ARMB1

## (undated) DEVICE — STRAP KNEE/BODY 31143004

## (undated) DEVICE — DRAPE LAP W/ARMBOARD 29410

## (undated) DEVICE — SU VICRYL 0 CT-2 27" J334H

## (undated) DEVICE — DAVINCI S GRASPER ENDOWRIST PROGRASP 420093

## (undated) DEVICE — DAVINCI SI DRAPE ACCESSORY KIT 3-ARM 420290

## (undated) DEVICE — ENDO TROCAR BLUNT TIP KII BALLOON 12X130MM C0R50

## (undated) DEVICE — DECANTER TRANSFER DEVICE 2008S

## (undated) DEVICE — NDL INSUFFLATION 13GA 120MM C2201

## (undated) DEVICE — Device

## (undated) DEVICE — LINEN GOWN OVERSIZE 5408

## (undated) DEVICE — SOL WATER IRRIG 1000ML BOTTLE 2F7114

## (undated) DEVICE — DRAIN PENROSE 5/8X18" LATEX 0918040

## (undated) DEVICE — ESU GROUND PAD ADULT W/CORD E7507

## (undated) DEVICE — DRSG STERI STRIP 1/2X4" R1547

## (undated) DEVICE — SOL NACL 0.9% IRRIG 1000ML BOTTLE 2F7124

## (undated) DEVICE — LINEN TOWEL PACK X30 5481

## (undated) DEVICE — LINEN ORTHO PACK 5446

## (undated) DEVICE — DAVINCI S CANNULA SEAL 8MM 400077

## (undated) DEVICE — DAVINCI HOT SHEARS TIP COVER  400180

## (undated) DEVICE — SUCTION MANIFOLD DORNOCH ULTRA CART UL-CL500

## (undated) DEVICE — PREP CHLORAPREP 26ML TINTED ORANGE  260815

## (undated) DEVICE — BLADE KNIFE SURG 11 371111

## (undated) DEVICE — GLOVE PROTEXIS W/NEU-THERA 7.5  2D73TE75

## (undated) DEVICE — SU WND CLOSURE VLOC 180 ABS 3-0 6" V-20 VLOCL0604

## (undated) DEVICE — LINEN GOWN X4 5410

## (undated) RX ORDER — ONDANSETRON 2 MG/ML
INJECTION INTRAMUSCULAR; INTRAVENOUS
Status: DISPENSED
Start: 2018-11-21

## (undated) RX ORDER — LIDOCAINE HYDROCHLORIDE 20 MG/ML
INJECTION, SOLUTION EPIDURAL; INFILTRATION; INTRACAUDAL; PERINEURAL
Status: DISPENSED
Start: 2018-11-21

## (undated) RX ORDER — PROPOFOL 10 MG/ML
INJECTION, EMULSION INTRAVENOUS
Status: DISPENSED
Start: 2018-11-21

## (undated) RX ORDER — FENTANYL CITRATE 50 UG/ML
INJECTION, SOLUTION INTRAMUSCULAR; INTRAVENOUS
Status: DISPENSED
Start: 2018-11-21

## (undated) RX ORDER — DEXAMETHASONE SODIUM PHOSPHATE 4 MG/ML
INJECTION, SOLUTION INTRA-ARTICULAR; INTRALESIONAL; INTRAMUSCULAR; INTRAVENOUS; SOFT TISSUE
Status: DISPENSED
Start: 2018-11-21

## (undated) RX ORDER — PHENYLEPHRINE HCL IN 0.9% NACL 1 MG/10 ML
SYRINGE (ML) INTRAVENOUS
Status: DISPENSED
Start: 2018-11-21

## (undated) RX ORDER — KETOROLAC TROMETHAMINE 30 MG/ML
INJECTION, SOLUTION INTRAMUSCULAR; INTRAVENOUS
Status: DISPENSED
Start: 2018-11-21

## (undated) RX ORDER — HYDROCODONE BITARTRATE AND ACETAMINOPHEN 5; 325 MG/1; MG/1
TABLET ORAL
Status: DISPENSED
Start: 2018-11-21

## (undated) RX ORDER — ACETAMINOPHEN 325 MG/1
TABLET ORAL
Status: DISPENSED
Start: 2018-11-21